# Patient Record
Sex: FEMALE | Race: WHITE | NOT HISPANIC OR LATINO | Employment: FULL TIME | URBAN - METROPOLITAN AREA
[De-identification: names, ages, dates, MRNs, and addresses within clinical notes are randomized per-mention and may not be internally consistent; named-entity substitution may affect disease eponyms.]

---

## 2017-02-03 DIAGNOSIS — Z12.31 OTHER SCREENING MAMMOGRAM: ICD-10-CM

## 2017-02-17 ENCOUNTER — OFFICE VISIT (OUTPATIENT)
Dept: OBSTETRICS AND GYNECOLOGY | Facility: CLINIC | Age: 47
End: 2017-02-17
Payer: COMMERCIAL

## 2017-02-17 VITALS
BODY MASS INDEX: 35.79 KG/M2 | HEIGHT: 67 IN | DIASTOLIC BLOOD PRESSURE: 64 MMHG | SYSTOLIC BLOOD PRESSURE: 118 MMHG | WEIGHT: 228 LBS

## 2017-02-17 DIAGNOSIS — Z01.419 WELL WOMAN EXAM WITH ROUTINE GYNECOLOGICAL EXAM: ICD-10-CM

## 2017-02-17 DIAGNOSIS — N89.8 VAGINAL DISCHARGE: Primary | ICD-10-CM

## 2017-02-17 PROCEDURE — 99999 PR PBB SHADOW E&M-EST. PATIENT-LVL III: CPT | Mod: PBBFAC,,, | Performed by: OBSTETRICS & GYNECOLOGY

## 2017-02-17 PROCEDURE — 99386 PREV VISIT NEW AGE 40-64: CPT | Mod: S$GLB,,, | Performed by: OBSTETRICS & GYNECOLOGY

## 2017-02-17 PROCEDURE — 87480 CANDIDA DNA DIR PROBE: CPT

## 2017-02-17 RX ORDER — ALPRAZOLAM 1 MG/1
1 TABLET ORAL
COMMUNITY

## 2017-02-17 RX ORDER — CITALOPRAM 20 MG/1
20 TABLET, FILM COATED ORAL DAILY
COMMUNITY

## 2017-02-17 NOTE — PROGRESS NOTES
CC: Annual check-up    SUBJECTIVE:   46 y.o. female   for annual routine Pap and checkup. No LMP recorded. Patient has had a hysterectomy..  She has no unusual complaints. Sisters MTHFR heterozygote and wants rto know if needs to be tested.        Past Medical History   Diagnosis Date    Allergy     Depression      Past Surgical History   Procedure Laterality Date     section      Knee surgery      Hysterectomy       Social History     Social History    Marital status:      Spouse name: N/A    Number of children: N/A    Years of education: N/A     Occupational History    Not on file.     Social History Main Topics    Smoking status: Never Smoker    Smokeless tobacco: Not on file    Alcohol use Yes    Drug use: No    Sexual activity: Yes     Partners: Male     Other Topics Concern    Not on file     Social History Narrative     Family History   Problem Relation Age of Onset    Diabetes Mother     Diabetes Father      OB History    Para Term  AB SAB TAB Ectopic Multiple Living   2 2 2       2      # Outcome Date GA Lbr Guy/2nd Weight Sex Delivery Anes PTL Lv   2 Term     F CS-LTranv  N Y   1 Term     F CS-LTranv   Y            Current Outpatient Prescriptions   Medication Sig Dispense Refill    alprazolam (XANAX) 1 MG tablet Take 1 mg by mouth.      citalopram (CELEXA) 20 MG tablet Take 20 mg by mouth.      fexofenadine (ALLEGRA) 180 MG tablet Take 180 mg by mouth once daily.       No current facility-administered medications for this visit.      Allergies: Review of patient's allergies indicates no known allergies.     ROS:  Constitutional: no weight loss, weight gain, fever, fatigue  Eyes:  No vision changes, glasses/contacts  ENT/Mouth: No ulcers, sinus problems, ears ringing, headache  Cardiovascular: No inability to lie flat, chest pain, exercise intolerance, swelling, heart palpitations  Respiratory: No wheezing, coughing blood, shortness of breath, or  "cough  Gastrointestinal: No diarrhea, bloody stool, nausea/vomiting, constipation, gas, hemorrhoids  Genitourinary: No blood in urine, painful urination, urgency of urination, frequency of urination, incomplete emptying, incontinence, abnormal bleeding, painful periods, heavy periods, vaginal discharge, vaginal odor, painful intercourse, sexual problems, bleeding after intercourse.  Musculoskeletal: No muscle weakness  Skin/Breast: No painful breasts, nipple discharge, masses, rash, ulcers  Neurological: No passing out, seizures, numbness, headache  Endocrine: No diabetes, hypothyroid, hyperthyroid, hot flashes, hair loss, abnormal hair growth, ance  Psychiatric: No depression, crying  Hematologic: No bruises, bleeding, swollen lymph nodes, anemia.      OBJECTIVE:   The patient appears well, alert, oriented x 3, in no distress.  Visit Vitals    /64    Ht 5' 7" (1.702 m)    Wt 103.4 kg (228 lb)    BMI 35.71 kg/m2     NECK: no thyromegaly, trachea midline  SKIN: no acne, striae, hirsutism  BREAST EXAM: breasts appear normal, no suspicious masses, no skin or nipple changes or axillary nodes, surgical scars noted from breast rdxn  ABDOMEN: abdominoplasty scars and no hernias, masses, or hepatosplenomegaly  GENITALIA: normal external genitalia, no erythema, no discharge  URETHRA: normal urethra, normal urethral meatus  VAGINA: vaginal discharge copious and white  CERVIX: absent  UTERUS: uterus absent  ADNEXA: no mass, fullness, tenderness and rt ovary palpable      ASSESSMENT:   well woman  1. Vaginal discharge    2. Well woman exam with routine gynecological exam        PLAN:   Mammogram at [facility]  return annually or prn  Orders Placed This Encounter    VAGINOSIS SCREEN BY DNA PROBE   will call with results  "

## 2017-02-17 NOTE — MR AVS SNAPSHOT
[address], [address]  [place] [address]  Phone: [phone]  Fax: [phone]                  Nahomy Pate   2017 9:00 AM   Office Visit    Description:  Female : 1970   Provider:  Topher Dominique MD   Department:  [place]           Reason for Visit     Gynecologic Exam           Diagnoses this Visit        Comments    Vaginal discharge    -  Primary     Well woman exam with routine gynecological exam                To Do List           Goals (5 Years of Data)     None      Follow-Up and Disposition     Return in about 1 year (around 2018).    Follow-up and Disposition History      [facility]     [facility] Nurse Care Line -  Assistance  Unless otherwise directed by your provider, please contact [facility], our nurse care line that is available for  assistance.     Registered nurses in the [facility] Center provide: appointment scheduling, clinical advisement, health education, and other advisory services.  Call: [phone] (toll free)               Medications           Message regarding Medications     Verify the changes and/or additions to your medication regime listed below are the same as discussed with your clinician today.  If any of these changes or additions are incorrect, please notify your healthcare provider.        STOP taking these medications     buPROPion (WELLBUTRIN SR) 150 MG TBSR 12 hr tablet Take 1 tablet (150 mg total) by mouth 2 (two) times daily.           Verify that the below list of medications is an accurate representation of the medications you are currently taking.  If none reported, the list may be blank. If incorrect, please contact your healthcare provider. Carry this list with you in case of emergency.           Current Medications     alprazolam (XANAX) 1 MG tablet Take 1 mg by mouth.    citalopram (CELEXA) 20 MG tablet Take 20 mg by mouth.    fexofenadine (ALLEGRA) 180 MG tablet Take 180 mg  "by mouth once daily.           Clinical Reference Information           Your Vitals Were     BP Height Weight BMI       118/64 5' 7" (1.702 m) 103.4 kg (228 lb) 35.71 kg/m2       Blood Pressure          Most Recent Value    BP  118/64      Allergies as of 2/17/2017     No Known Allergies      Immunizations Administered on Date of Encounter - 2/17/2017     None      Orders Placed During Today's Visit      Normal Orders This Visit    VAGINOSIS SCREEN BY DNA PROBE       Language Assistance Services     ATTENTION: Language assistance services are available, free of charge. Please call [phone].      ATENCIÓN: Si devin hammond, tiene a valdes disposición servicios gratuitos de asistencia lingüística. Llame al [phone].     ADARSH Ý: N?u b?n nói Ti?ng Vi?t, có các d?ch v? h? tr? ngôn ng? mi?n phí dành cho b?n. G?i s? [phone].         [facility] complies with applicable Federal civil rights laws and does not discriminate on the basis of race, color, national origin, age, disability, or sex.        "

## 2017-02-19 LAB
CANDIDA RRNA VAG QL PROBE: NEGATIVE
G VAGINALIS RRNA GENITAL QL PROBE: POSITIVE
T VAGINALIS RRNA GENITAL QL PROBE: NEGATIVE

## 2017-02-20 ENCOUNTER — TELEPHONE (OUTPATIENT)
Dept: OBSTETRICS AND GYNECOLOGY | Facility: CLINIC | Age: 47
End: 2017-02-20

## 2017-02-20 RX ORDER — METRONIDAZOLE 500 MG/1
500 TABLET ORAL 2 TIMES DAILY
Qty: 14 TABLET | Refills: 0 | Status: SHIPPED | OUTPATIENT
Start: 2017-02-20 | End: 2017-02-27

## 2017-04-05 ENCOUNTER — TELEPHONE (OUTPATIENT)
Dept: OBSTETRICS AND GYNECOLOGY | Facility: CLINIC | Age: 47
End: 2017-04-05

## 2017-04-05 ENCOUNTER — PATIENT MESSAGE (OUTPATIENT)
Dept: OBSTETRICS AND GYNECOLOGY | Facility: CLINIC | Age: 47
End: 2017-04-05

## 2017-04-05 NOTE — TELEPHONE ENCOUNTER
----- Message from Earline Ramirez sent at 4/5/2017  2:21 PM CDT -----  Contact: self, [phone]  Patient called in returning your call. Please advise.

## 2018-07-30 ENCOUNTER — OFFICE VISIT (OUTPATIENT)
Dept: OBSTETRICS AND GYNECOLOGY | Facility: CLINIC | Age: 48
End: 2018-07-30
Payer: COMMERCIAL

## 2018-07-30 VITALS
HEIGHT: 67 IN | DIASTOLIC BLOOD PRESSURE: 60 MMHG | WEIGHT: 218 LBS | BODY MASS INDEX: 34.21 KG/M2 | SYSTOLIC BLOOD PRESSURE: 116 MMHG

## 2018-07-30 DIAGNOSIS — N39.0 URINARY TRACT INFECTION WITHOUT HEMATURIA, SITE UNSPECIFIED: Primary | ICD-10-CM

## 2018-07-30 DIAGNOSIS — Z01.419 WELL WOMAN EXAM WITH ROUTINE GYNECOLOGICAL EXAM: ICD-10-CM

## 2018-07-30 DIAGNOSIS — N89.8 VAGINAL DISCHARGE: ICD-10-CM

## 2018-07-30 PROCEDURE — 87186 SC STD MICRODIL/AGAR DIL: CPT

## 2018-07-30 PROCEDURE — 87491 CHLMYD TRACH DNA AMP PROBE: CPT

## 2018-07-30 PROCEDURE — 87088 URINE BACTERIA CULTURE: CPT

## 2018-07-30 PROCEDURE — 99999 PR PBB SHADOW E&M-EST. PATIENT-LVL III: CPT | Mod: PBBFAC,,, | Performed by: OBSTETRICS & GYNECOLOGY

## 2018-07-30 PROCEDURE — 87086 URINE CULTURE/COLONY COUNT: CPT

## 2018-07-30 PROCEDURE — 87660 TRICHOMONAS VAGIN DIR PROBE: CPT

## 2018-07-30 PROCEDURE — 99396 PREV VISIT EST AGE 40-64: CPT | Mod: S$GLB,,, | Performed by: OBSTETRICS & GYNECOLOGY

## 2018-07-30 PROCEDURE — 87077 CULTURE AEROBIC IDENTIFY: CPT

## 2018-07-30 RX ORDER — CIPROFLOXACIN 250 MG/1
250 TABLET, FILM COATED ORAL 2 TIMES DAILY
Qty: 6 TABLET | Refills: 0 | Status: SHIPPED | OUTPATIENT
Start: 2018-07-30 | End: 2018-08-04

## 2018-07-30 RX ORDER — FLUCONAZOLE 150 MG/1
150 TABLET ORAL ONCE
Qty: 1 TABLET | Refills: 1 | Status: SHIPPED | OUTPATIENT
Start: 2018-07-30 | End: 2018-07-30

## 2018-07-30 NOTE — PROGRESS NOTES
CC: Annual check-up    SUBJECTIVE:   48 y.o. female   for annual routine Pap and checkup. No LMP recorded. Patient has had a hysterectomy..  She complaints of pressure in her lower abdomen, but denies dysuria, frequency and urgency.      Sister and pt Abigail Oneill  8 months ago from MI, still trying to cope  Past Medical History:   Diagnosis Date    Allergy     Depression      Past Surgical History:   Procedure Laterality Date     SECTION      HYSTERECTOMY      KNEE SURGERY      OOPHORECTOMY       Social History     Social History    Marital status:      Spouse name: N/A    Number of children: N/A    Years of education: N/A     Occupational History    Not on file.     Social History Main Topics    Smoking status: Never Smoker    Smokeless tobacco: Not on file    Alcohol use Yes    Drug use: No    Sexual activity: Yes     Partners: Male     Other Topics Concern    Not on file     Social History Narrative    No narrative on file     Family History   Problem Relation Age of Onset    Diabetes Mother     Diabetes Father      OB History    Para Term  AB Living   2 2 2     2   SAB TAB Ectopic Multiple Live Births           2      # Outcome Date GA Lbr Guy/2nd Weight Sex Delivery Anes PTL Lv   2 Term     F CS-LTranv  N NORBERTO   1 Term     F CS-LTranv   NORBERTO            Current Outpatient Prescriptions   Medication Sig Dispense Refill    alprazolam (XANAX) 1 MG tablet Take 1 mg by mouth.      citalopram (CELEXA) 20 MG tablet Take 20 mg by mouth.      fexofenadine (ALLEGRA) 180 MG tablet Take 180 mg by mouth once daily.      ciprofloxacin HCl (CIPRO) 250 MG tablet Take 1 tablet (250 mg total) by mouth 2 (two) times daily. for 5 days 6 tablet 0     No current facility-administered medications for this visit.      Allergies: Patient has no known allergies.     ROS:  Constitutional: no weight loss, weight gain, fever, fatigue  Eyes:  No vision changes,  "glasses/contacts  ENT/Mouth: No ulcers, sinus problems, ears ringing, headache  Cardiovascular: No inability to lie flat, chest pain, exercise intolerance, swelling, heart palpitations  Respiratory: No wheezing, coughing blood, shortness of breath, or cough  Gastrointestinal: No diarrhea, bloody stool, nausea/vomiting, constipation, gas, hemorrhoids  Genitourinary: No blood in urine, painful urination, urgency of urination, frequency of urination, incomplete emptying, incontinence, abnormal bleeding, painful periods, heavy periods, vaginal discharge, vaginal odor, painful intercourse, sexual problems, bleeding after intercourse.  Musculoskeletal: No muscle weakness  Skin/Breast: No painful breasts, nipple discharge, masses, rash, ulcers  Neurological: No passing out, seizures, numbness, headache  Endocrine: No diabetes, hypothyroid, hyperthyroid, hot flashes, hair loss, abnormal hair growth, ance  Psychiatric: No depression, crying  Hematologic: No bruises, bleeding, swollen lymph nodes, anemia.      OBJECTIVE:   The patient appears well, alert, oriented x 3, in no distress.  /60   Ht 5' 7" (1.702 m)   Wt 98.9 kg (218 lb)   BMI 34.14 kg/m²   NECK: no thyromegaly, trachea midline  SKIN: no acne, striae, hirsutism  BREAST EXAM: breasts appear normal, no suspicious masses, no skin or nipple changes or axillary nodes  ABDOMEN: no hernias, masses, or hepatosplenomegaly  GENITALIA: normal external genitalia, no erythema, no discharge  URETHRA: normal urethra, normal urethral meatus  VAGINA: vaginal discharge white  CERVIX: no lesions or cervical motion tenderness and absent  UTERUS: uterus absent  ADNEXA: not evaluated      ASSESSMENT:   well woman  1. Urinary tract infection without hematuria, site unspecified    2. Vaginal discharge    3. Well woman exam with routine gynecological exam        PLAN:   mammogram  return annually or prn  Orders Placed This Encounter    Urine culture    Vaginosis Screen by DNA " Probe    C. trachomatis/N. gonorrhoeae by AMP DNA    Mammo Digital Screening Bilat with CAD    ciprofloxacin HCl (CIPRO) 250 MG tablet

## 2018-07-31 LAB
CANDIDA RRNA VAG QL PROBE: NEGATIVE
G VAGINALIS RRNA GENITAL QL PROBE: NEGATIVE
T VAGINALIS RRNA GENITAL QL PROBE: NEGATIVE

## 2018-08-01 LAB
C TRACH DNA SPEC QL NAA+PROBE: NOT DETECTED
N GONORRHOEA DNA SPEC QL NAA+PROBE: NOT DETECTED

## 2018-08-03 LAB — BACTERIA UR CULT: NORMAL

## 2018-08-06 ENCOUNTER — PATIENT MESSAGE (OUTPATIENT)
Dept: OBSTETRICS AND GYNECOLOGY | Facility: CLINIC | Age: 48
End: 2018-08-06

## 2018-09-05 DIAGNOSIS — R10.11 RIGHT UPPER QUADRANT ABDOMINAL PAIN: Primary | ICD-10-CM

## 2018-11-23 ENCOUNTER — PATIENT MESSAGE (OUTPATIENT)
Dept: ORTHOPEDICS | Facility: CLINIC | Age: 48
End: 2018-11-23

## 2019-01-03 ENCOUNTER — OFFICE VISIT (OUTPATIENT)
Dept: CARDIOLOGY | Facility: CLINIC | Age: 49
End: 2019-01-03
Payer: COMMERCIAL

## 2019-01-03 VITALS
HEART RATE: 89 BPM | SYSTOLIC BLOOD PRESSURE: 127 MMHG | WEIGHT: 229 LBS | HEIGHT: 67 IN | BODY MASS INDEX: 35.94 KG/M2 | DIASTOLIC BLOOD PRESSURE: 85 MMHG

## 2019-01-03 DIAGNOSIS — R00.2 PALPITATIONS: ICD-10-CM

## 2019-01-03 DIAGNOSIS — G89.29 CHRONIC CHEST PAIN: ICD-10-CM

## 2019-01-03 DIAGNOSIS — R07.9 CHEST PAIN, UNSPECIFIED TYPE: Primary | ICD-10-CM

## 2019-01-03 DIAGNOSIS — F43.9 STRESS: ICD-10-CM

## 2019-01-03 DIAGNOSIS — Z91.89 CARDIOVASCULAR EVENT RISK: ICD-10-CM

## 2019-01-03 DIAGNOSIS — R07.9 CHRONIC CHEST PAIN: ICD-10-CM

## 2019-01-03 PROCEDURE — 99999 PR PBB SHADOW E&M-EST. PATIENT-LVL III: CPT | Mod: PBBFAC,,, | Performed by: INTERNAL MEDICINE

## 2019-01-03 PROCEDURE — 99205 OFFICE O/P NEW HI 60 MIN: CPT | Mod: S$GLB,,, | Performed by: INTERNAL MEDICINE

## 2019-01-03 PROCEDURE — 93005 EKG 12-LEAD: ICD-10-PCS | Mod: S$GLB,,, | Performed by: INTERNAL MEDICINE

## 2019-01-03 PROCEDURE — 3008F BODY MASS INDEX DOCD: CPT | Mod: CPTII,S$GLB,, | Performed by: INTERNAL MEDICINE

## 2019-01-03 PROCEDURE — 99999 PR PBB SHADOW E&M-EST. PATIENT-LVL III: ICD-10-PCS | Mod: PBBFAC,,, | Performed by: INTERNAL MEDICINE

## 2019-01-03 PROCEDURE — 93010 ELECTROCARDIOGRAM REPORT: CPT | Mod: S$GLB,,, | Performed by: INTERNAL MEDICINE

## 2019-01-03 PROCEDURE — 3008F PR BODY MASS INDEX (BMI) DOCUMENTED: ICD-10-PCS | Mod: CPTII,S$GLB,, | Performed by: INTERNAL MEDICINE

## 2019-01-03 PROCEDURE — 93005 ELECTROCARDIOGRAM TRACING: CPT | Mod: S$GLB,,, | Performed by: INTERNAL MEDICINE

## 2019-01-03 PROCEDURE — 93010 EKG 12-LEAD: ICD-10-PCS | Mod: S$GLB,,, | Performed by: INTERNAL MEDICINE

## 2019-01-03 PROCEDURE — 99205 PR OFFICE/OUTPT VISIT, NEW, LEVL V, 60-74 MIN: ICD-10-PCS | Mod: S$GLB,,, | Performed by: INTERNAL MEDICINE

## 2019-01-03 RX ORDER — TIZANIDINE 4 MG/1
4 TABLET ORAL
COMMUNITY
Start: 2018-11-30 | End: 2020-10-16

## 2019-01-03 NOTE — PROGRESS NOTES
Subjective:    Patient ID:  Nahomy Pate is a 48 y.o. female who presents for follow-up of Palpitations and Chest Pain      HPI  47 y/o female with no significant past med hx who presents for evaluation. She has a sister that  of an MI and a young age and wanted to get cardiac evaluation. She has been having substernal, non exertional, non radiating CP recently. Also has been having palps and some SOB. Denies orthopnea, PND, syncope, LE edema. No regular medicines and has not been working out. Non smoker (sister was a smoker) and no heavy EtOH use.     Review of Systems   Constitution: Negative for weakness and malaise/fatigue.   HENT: Negative for congestion.    Eyes: Negative for blurred vision.   Cardiovascular: Positive for chest pain, dyspnea on exertion and palpitations. Negative for claudication, cyanosis, irregular heartbeat, leg swelling, near-syncope, orthopnea, paroxysmal nocturnal dyspnea and syncope.   Respiratory: Positive for shortness of breath.    Endocrine: Negative for polyuria.   Hematologic/Lymphatic: Negative for bleeding problem.   Skin: Negative for itching and rash.   Musculoskeletal: Negative for joint swelling, muscle cramps and muscle weakness.   Gastrointestinal: Negative for abdominal pain, hematemesis, hematochezia, melena, nausea and vomiting.   Genitourinary: Negative for dysuria and hematuria.   Neurological: Negative for dizziness, focal weakness, headaches, light-headedness and loss of balance.   Psychiatric/Behavioral: Negative for depression. The patient is not nervous/anxious.         Objective:    Physical Exam   Constitutional: She is oriented to person, place, and time. She appears well-developed and well-nourished.   HENT:   Head: Normocephalic and atraumatic.   Neck: Neck supple. No JVD present.   Cardiovascular: Normal rate, regular rhythm and normal heart sounds.   Pulses:       Carotid pulses are 2+ on the right side, and 2+ on the left side.       Radial pulses are 2+  on the right side, and 2+ on the left side.        Femoral pulses are 2+ on the right side, and 2+ on the left side.       Dorsalis pedis pulses are 2+ on the right side, and 2+ on the left side.        Posterior tibial pulses are 2+ on the right side, and 2+ on the left side.   Pulmonary/Chest: Effort normal and breath sounds normal.   Abdominal: Soft. Bowel sounds are normal.   Musculoskeletal: She exhibits no edema.   Neurological: She is alert and oriented to person, place, and time.   Skin: Skin is warm and dry.   Psychiatric: She has a normal mood and affect. Her behavior is normal. Thought content normal.         Assessment:       1. Chest pain, unspecified type    2. Palpitations    3. Cardiovascular event risk    4. Stress      47 y/o pt with hx and presentation as above. Has been having symptoms concerning for cardiac etiology and will evaluate with non invasive cardiac stress test, Holter, and lipid panel/labs. She also would like to obtain a CAC score which I think is reasonable. Discussed the etiology, evaluation, and management of CAD. Discussed the importance of heart healthy diet, and regular exercise.         Plan:       -Treadmill stress  -Holter monitor  -Lipid panel  -CAC score  -f/u in 1 month

## 2019-01-14 ENCOUNTER — PATIENT MESSAGE (OUTPATIENT)
Dept: CARDIOLOGY | Facility: CLINIC | Age: 49
End: 2019-01-14

## 2019-01-15 ENCOUNTER — TELEPHONE (OUTPATIENT)
Dept: CARDIOLOGY | Facility: CLINIC | Age: 49
End: 2019-01-15

## 2019-01-15 ENCOUNTER — HOSPITAL ENCOUNTER (OUTPATIENT)
Dept: CARDIOLOGY | Facility: HOSPITAL | Age: 49
Discharge: HOME | End: 2019-01-15
Attending: INTERNAL MEDICINE
Payer: COMMERCIAL

## 2019-01-15 DIAGNOSIS — R07.9 CHEST PAIN, UNSPECIFIED TYPE: ICD-10-CM

## 2019-01-15 DIAGNOSIS — R00.2 PALPITATIONS: ICD-10-CM

## 2019-01-15 DIAGNOSIS — Z91.89 CARDIOVASCULAR EVENT RISK: ICD-10-CM

## 2019-01-15 LAB
CV STRESS BASE HR: 90
DIASTOLIC BLOOD PRESSURE: 90
OHS CV CPX 1 MINUTE RECOVERY HEART RATE: 160 BPM
OHS CV CPX 85 PERCENT MAX PREDICTED HEART RATE MALE: 141
OHS CV CPX MAX PREDICTED HEART RATE: 166
OHS CV CPX PATIENT IS FEMALE: 1
OHS CV CPX PATIENT IS MALE: 0
OHS CV CPX PEAK DIASTOLIC BLOOD PRESSURE: 95 MMHG
OHS CV CPX PEAK HEAR RATE: 173
OHS CV CPX PEAK RATE PRESSURE PRODUCT: NORMAL
OHS CV CPX PEAK SYSTOLIC BLOOD PRESSURE: 172
OHS CV CPX PERCENT MAX PREDICTED HEART RATE ACHIEVED: 105
OHS CV CPX PERCENT TARGET HEART RATE ACHIEVED: 117.69
OHS CV CPX RATE PRESSURE PRODUCT PRESENTING: NORMAL
OHS CV CPX TARGET HEART RATE: 147
STRESS ANGINA INDEX: 0
STRESS ECHO POST EXERCISE DUR MIN: 10 MIN
SYSTOLIC BLOOD PRESSURE: 136

## 2019-01-15 PROCEDURE — 93016 CV STRESS TEST SUPVJ ONLY: CPT | Mod: ,,, | Performed by: INTERNAL MEDICINE

## 2019-01-15 PROCEDURE — 93017 CV STRESS TEST TRACING ONLY: CPT | Mod: PO,ER

## 2019-01-15 PROCEDURE — 93016 STRESS TEST ONLY (CUPID ONLY): ICD-10-PCS | Mod: ,,, | Performed by: INTERNAL MEDICINE

## 2019-01-15 PROCEDURE — 93018 STRESS TEST ONLY (CUPID ONLY): ICD-10-PCS | Mod: ,,, | Performed by: INTERNAL MEDICINE

## 2019-01-15 PROCEDURE — 93018 CV STRESS TEST I&R ONLY: CPT | Mod: ,,, | Performed by: INTERNAL MEDICINE

## 2019-01-15 NOTE — TELEPHONE ENCOUNTER
----- Message from Leander Talavera MD sent at 1/15/2019  4:15 PM CST -----  Your stress test was normal

## 2019-01-18 ENCOUNTER — LAB VISIT (OUTPATIENT)
Dept: LAB | Facility: HOSPITAL | Age: 49
End: 2019-01-18
Attending: INTERNAL MEDICINE
Payer: COMMERCIAL

## 2019-01-18 DIAGNOSIS — R07.9 CHEST PAIN, UNSPECIFIED TYPE: ICD-10-CM

## 2019-01-18 DIAGNOSIS — Z91.89 CARDIOVASCULAR EVENT RISK: ICD-10-CM

## 2019-01-18 LAB
ALBUMIN SERPL BCP-MCNC: 4.1 G/DL
ALP SERPL-CCNC: 63 U/L
ALT SERPL W/O P-5'-P-CCNC: 22 U/L
ANION GAP SERPL CALC-SCNC: 6 MMOL/L
AST SERPL-CCNC: 22 U/L
BASOPHILS # BLD AUTO: 0.02 K/UL
BASOPHILS NFR BLD: 0.3 %
BILIRUB SERPL-MCNC: 0.7 MG/DL
BUN SERPL-MCNC: 17 MG/DL
CALCIUM SERPL-MCNC: 8.8 MG/DL
CHLORIDE SERPL-SCNC: 104 MMOL/L
CHOLEST SERPL-MCNC: 198 MG/DL
CHOLEST/HDLC SERPL: 5.2 {RATIO}
CO2 SERPL-SCNC: 28 MMOL/L
CREAT SERPL-MCNC: 0.78 MG/DL
DIFFERENTIAL METHOD: NORMAL
EOSINOPHIL # BLD AUTO: 0.1 K/UL
EOSINOPHIL NFR BLD: 2.1 %
ERYTHROCYTE [DISTWIDTH] IN BLOOD BY AUTOMATED COUNT: 13.3 %
EST. GFR  (AFRICAN AMERICAN): >60 ML/MIN/1.73 M^2
EST. GFR  (NON AFRICAN AMERICAN): >60 ML/MIN/1.73 M^2
GLUCOSE SERPL-MCNC: 128 MG/DL
HCT VFR BLD AUTO: 42.6 %
HDLC SERPL-MCNC: 38 MG/DL
HDLC SERPL: 19.2 %
HGB BLD-MCNC: 13.7 G/DL
LDLC SERPL CALC-MCNC: 130 MG/DL
LYMPHOCYTES # BLD AUTO: 1.6 K/UL
LYMPHOCYTES NFR BLD: 27.9 %
MCH RBC QN AUTO: 28.2 PG
MCHC RBC AUTO-ENTMCNC: 32.2 G/DL
MCV RBC AUTO: 88 FL
MONOCYTES # BLD AUTO: 0.6 K/UL
MONOCYTES NFR BLD: 9.7 %
NEUTROPHILS # BLD AUTO: 3.5 K/UL
NEUTROPHILS NFR BLD: 59.7 %
NONHDLC SERPL-MCNC: 160 MG/DL
PLATELET # BLD AUTO: 223 K/UL
PMV BLD AUTO: 10.2 FL
POTASSIUM SERPL-SCNC: 4.3 MMOL/L
PROT SERPL-MCNC: 6.9 G/DL
RBC # BLD AUTO: 4.85 M/UL
SODIUM SERPL-SCNC: 138 MMOL/L
TRIGL SERPL-MCNC: 150 MG/DL
WBC # BLD AUTO: 5.8 K/UL

## 2019-01-18 PROCEDURE — 36415 COLL VENOUS BLD VENIPUNCTURE: CPT | Mod: PO,ER

## 2019-01-18 PROCEDURE — 80053 COMPREHEN METABOLIC PANEL: CPT | Mod: PO,ER

## 2019-01-18 PROCEDURE — 80061 LIPID PANEL: CPT

## 2019-01-18 PROCEDURE — 85025 COMPLETE CBC W/AUTO DIFF WBC: CPT | Mod: PO,ER

## 2019-01-22 ENCOUNTER — PATIENT MESSAGE (OUTPATIENT)
Dept: CARDIOLOGY | Facility: CLINIC | Age: 49
End: 2019-01-22

## 2019-01-24 ENCOUNTER — PATIENT MESSAGE (OUTPATIENT)
Dept: CARDIOLOGY | Facility: CLINIC | Age: 49
End: 2019-01-24

## 2019-01-25 ENCOUNTER — PATIENT MESSAGE (OUTPATIENT)
Dept: CARDIOLOGY | Facility: CLINIC | Age: 49
End: 2019-01-25

## 2019-02-14 ENCOUNTER — OFFICE VISIT (OUTPATIENT)
Dept: CARDIOLOGY | Facility: CLINIC | Age: 49
End: 2019-02-14
Payer: COMMERCIAL

## 2019-02-14 VITALS
HEART RATE: 88 BPM | BODY MASS INDEX: 34.69 KG/M2 | DIASTOLIC BLOOD PRESSURE: 86 MMHG | WEIGHT: 221 LBS | OXYGEN SATURATION: 95 % | HEIGHT: 67 IN | SYSTOLIC BLOOD PRESSURE: 134 MMHG

## 2019-02-14 DIAGNOSIS — F32.A DEPRESSION, UNSPECIFIED DEPRESSION TYPE: ICD-10-CM

## 2019-02-14 DIAGNOSIS — R73.9 BLOOD GLUCOSE ELEVATED: ICD-10-CM

## 2019-02-14 DIAGNOSIS — R07.9 CHEST PAIN, UNSPECIFIED TYPE: Primary | ICD-10-CM

## 2019-02-14 DIAGNOSIS — Z91.89 CARDIOVASCULAR EVENT RISK: ICD-10-CM

## 2019-02-14 DIAGNOSIS — R00.2 PALPITATIONS: ICD-10-CM

## 2019-02-14 DIAGNOSIS — F43.9 STRESS: ICD-10-CM

## 2019-02-14 DIAGNOSIS — E78.89 LIPIDS ABNORMAL: ICD-10-CM

## 2019-02-14 PROCEDURE — 99999 PR PBB SHADOW E&M-EST. PATIENT-LVL III: CPT | Mod: PBBFAC,,, | Performed by: INTERNAL MEDICINE

## 2019-02-14 PROCEDURE — 99214 OFFICE O/P EST MOD 30 MIN: CPT | Mod: S$GLB,,, | Performed by: INTERNAL MEDICINE

## 2019-02-14 PROCEDURE — 3008F BODY MASS INDEX DOCD: CPT | Mod: CPTII,S$GLB,, | Performed by: INTERNAL MEDICINE

## 2019-02-14 PROCEDURE — 99999 PR PBB SHADOW E&M-EST. PATIENT-LVL III: ICD-10-PCS | Mod: PBBFAC,,, | Performed by: INTERNAL MEDICINE

## 2019-02-14 PROCEDURE — 3008F PR BODY MASS INDEX (BMI) DOCUMENTED: ICD-10-PCS | Mod: CPTII,S$GLB,, | Performed by: INTERNAL MEDICINE

## 2019-02-14 PROCEDURE — 99214 PR OFFICE/OUTPT VISIT, EST, LEVL IV, 30-39 MIN: ICD-10-PCS | Mod: S$GLB,,, | Performed by: INTERNAL MEDICINE

## 2019-02-17 NOTE — PROGRESS NOTES
Subjective:    Patient ID:  Nahomy Pate is a 48 y.o. female who presents for follow-up of Chest Pain      HPI     49 y/o female with no significant past med hx who presents for evaluation. She has a sister that  of an MI and a young age and wanted to get cardiac evaluation. She has been having substernal, non exertional, non radiating CP recently. Also has been having palps and some SOB. Denies orthopnea, PND, syncope, LE edema. No regular medicines and has not been working out. Non smoker (sister was a smoker) and no heavy EtOH use. Had negative stress test, CAC score of zero and labs with elevated glucose. Did not obtain Holter bc was too expensive.     Review of Systems   Constitution: Negative for weakness and malaise/fatigue.   HENT: Negative for congestion.    Eyes: Negative for blurred vision.   Cardiovascular: Positive for chest pain and dyspnea on exertion. Negative for claudication, cyanosis, irregular heartbeat, leg swelling, near-syncope, orthopnea, palpitations, paroxysmal nocturnal dyspnea and syncope.   Respiratory: Negative for shortness of breath.    Endocrine: Negative for polyuria.   Hematologic/Lymphatic: Negative for bleeding problem.   Skin: Negative for itching and rash.   Musculoskeletal: Negative for joint swelling, muscle cramps and muscle weakness.   Gastrointestinal: Negative for abdominal pain, hematemesis, hematochezia, melena, nausea and vomiting.   Genitourinary: Negative for dysuria and hematuria.   Neurological: Negative for dizziness, focal weakness, headaches, light-headedness and loss of balance.   Psychiatric/Behavioral: Negative for depression. The patient is not nervous/anxious.         Objective:    Physical Exam   Constitutional: She is oriented to person, place, and time. She appears well-developed and well-nourished.   HENT:   Head: Normocephalic and atraumatic.   Neck: Neck supple. No JVD present.   Cardiovascular: Normal rate, regular rhythm and normal heart sounds.    Pulses:       Carotid pulses are 2+ on the right side, and 2+ on the left side.       Radial pulses are 2+ on the right side, and 2+ on the left side.        Femoral pulses are 2+ on the right side, and 2+ on the left side.       Dorsalis pedis pulses are 2+ on the right side, and 2+ on the left side.        Posterior tibial pulses are 2+ on the right side, and 2+ on the left side.   Pulmonary/Chest: Effort normal and breath sounds normal.   Abdominal: Soft. Bowel sounds are normal.   Musculoskeletal: She exhibits no edema.   Neurological: She is alert and oriented to person, place, and time.   Skin: Skin is warm and dry.   Psychiatric: She has a normal mood and affect. Her behavior is normal. Thought content normal.         Assessment:       1. Chest pain, unspecified type    2. Blood glucose elevated    3. Lipids abnormal    4. Palpitations    5. Cardiovascular event risk    6. Depression, unspecified depression type    7. Stress      49 y/o pt with hx and presentation as above. Doing well from a cardiac perspective and compensated from a HF perspective. Will obtain lipid panel in a few months and A1C. Needs PCP.        Plan:       -Lipid panel  -HgbA1C  -f/u in 1 year

## 2019-08-02 ENCOUNTER — OFFICE VISIT (OUTPATIENT)
Dept: OBSTETRICS AND GYNECOLOGY | Facility: CLINIC | Age: 49
End: 2019-08-02
Payer: COMMERCIAL

## 2019-08-02 VITALS
BODY MASS INDEX: 35.81 KG/M2 | DIASTOLIC BLOOD PRESSURE: 70 MMHG | WEIGHT: 228.63 LBS | SYSTOLIC BLOOD PRESSURE: 118 MMHG

## 2019-08-02 DIAGNOSIS — Z01.419 WELL WOMAN EXAM WITH ROUTINE GYNECOLOGICAL EXAM: Primary | ICD-10-CM

## 2019-08-02 PROCEDURE — 99999 PR PBB SHADOW E&M-EST. PATIENT-LVL III: ICD-10-PCS | Mod: PBBFAC,,, | Performed by: OBSTETRICS & GYNECOLOGY

## 2019-08-02 PROCEDURE — 99999 PR PBB SHADOW E&M-EST. PATIENT-LVL III: CPT | Mod: PBBFAC,,, | Performed by: OBSTETRICS & GYNECOLOGY

## 2019-08-02 PROCEDURE — 99396 PR PREVENTIVE VISIT,EST,40-64: ICD-10-PCS | Mod: S$GLB,,, | Performed by: OBSTETRICS & GYNECOLOGY

## 2019-08-02 PROCEDURE — 88142 CYTOPATH C/V THIN LAYER: CPT

## 2019-08-02 PROCEDURE — 99396 PREV VISIT EST AGE 40-64: CPT | Mod: S$GLB,,, | Performed by: OBSTETRICS & GYNECOLOGY

## 2019-08-02 NOTE — PROGRESS NOTES
CC: Annual check-up    SUBJECTIVE:   49 y.o. female   for annual routine Pap and checkup. No LMP recorded. Patient has had a hysterectomy..  She complains of occ ovulatroy painf, no hot flashes but does have some vag dryness alleviated with lubrication.        Past Medical History:   Diagnosis Date    Allergy     Depression      Past Surgical History:   Procedure Laterality Date     SECTION      HYSTERECTOMY      KNEE SURGERY      OOPHORECTOMY     tummy tuck  Social History     Socioeconomic History    Marital status:      Spouse name: Not on file    Number of children: Not on file    Years of education: Not on file    Highest education level: Not on file   Occupational History    Not on file   Social Needs    Financial resource strain: Not on file    Food insecurity:     Worry: Not on file     Inability: Not on file    Transportation needs:     Medical: Not on file     Non-medical: Not on file   Tobacco Use    Smoking status: Never Smoker    Smokeless tobacco: Never Used   Substance and Sexual Activity    Alcohol use: Yes    Drug use: No    Sexual activity: Yes     Partners: Male     Birth control/protection: See Surgical Hx   Lifestyle    Physical activity:     Days per week: Not on file     Minutes per session: Not on file    Stress: Not on file   Relationships    Social connections:     Talks on phone: Not on file     Gets together: Not on file     Attends Islam service: Not on file     Active member of club or organization: Not on file     Attends meetings of clubs or organizations: Not on file     Relationship status: Not on file   Other Topics Concern    Not on file   Social History Narrative    Not on file     Family History   Problem Relation Age of Onset    Diabetes Mother     Diabetes Father      OB History    Para Term  AB Living   2 2 2     2   SAB TAB Ectopic Multiple Live Births           2      # Outcome Date GA Lbr Guy/2nd Weight Sex  Delivery Anes PTL Lv   2 Term 07/07/05 38w0d   F CS-LTranv  N NORBERTO   1 Term 03/09/01 41w0d   F CS-LTranv   NORBERTO         Current Outpatient Medications   Medication Sig Dispense Refill    alprazolam (XANAX) 1 MG tablet Take 1 mg by mouth.      citalopram (CELEXA) 20 MG tablet Take 20 mg by mouth once daily.       fexofenadine (ALLEGRA) 180 MG tablet Take 180 mg by mouth once daily.      tiZANidine (ZANAFLEX) 4 MG tablet Take 4 mg by mouth as needed.        No current facility-administered medications for this visit.      Allergies: Patient has no known allergies.     ROS:  Constitutional: no weight loss, weight gain, fever, fatigue  Eyes:  No vision changes, glasses/contacts  ENT/Mouth: No ulcers, sinus problems, ears ringing, headache  Cardiovascular: No inability to lie flat, chest pain, exercise intolerance, swelling, heart palpitations  Respiratory: No wheezing, coughing blood, shortness of breath, or cough  Gastrointestinal: No diarrhea, bloody stool, nausea/vomiting, constipation, gas, hemorrhoids  Genitourinary: No blood in urine, painful urination, urgency of urination, frequency of urination, incomplete emptying, incontinence, abnormal bleeding, painful periods, heavy periods, vaginal discharge, vaginal odor, painful intercourse, sexual problems, bleeding after intercourse.  Musculoskeletal: No muscle weakness  Skin/Breast: No painful breasts, nipple discharge, masses, rash, ulcers  Neurological: No passing out, seizures, numbness, headache  Endocrine: No diabetes, hypothyroid, hyperthyroid, hot flashes, hair loss, abnormal hair growth, ance  Psychiatric: No depression, crying  Hematologic: No bruises, bleeding, swollen lymph nodes, anemia.      OBJECTIVE:   The patient appears well, alert, oriented x 3, in no distress.  /70   Wt 103.7 kg (228 lb 9.9 oz)   BMI 35.81 kg/m²   NECK: no thyromegaly, trachea midline  SKIN: no acne, striae, hirsutism  BREAST EXAM: breasts appear normal, no suspicious  masses, no skin or nipple changes or axillary nodes, surgical scars noted from breast rdxn  ABDOMEN: no hernias, masses, or hepatosplenomegaly  GENITALIA: normal external genitalia, no erythema, no discharge  URETHRA: normal urethra, normal urethral meatus  VAGINA: mucosal atrophy  CERVIX: no lesions or cervical motion tenderness and small prtion of post cx still present  UTERUS: uterus absent  ADNEXA: normal adnexa and no mass, fullness, tenderness  Still has rt ovary    ASSESSMENT:   well woman  1. Well woman exam with routine gynecological exam        PLAN:   mammogram  pap smear  return annually or prn  Orders Placed This Encounter    Liquid-based pap smear, screening

## 2019-09-27 ENCOUNTER — CLINICAL SUPPORT (OUTPATIENT)
Dept: FAMILY MEDICINE | Facility: CLINIC | Age: 49
End: 2019-09-27

## 2019-09-27 DIAGNOSIS — Z00.00 ROUTINE GENERAL MEDICAL EXAMINATION AT A HEALTH CARE FACILITY: Primary | ICD-10-CM

## 2019-09-27 PROCEDURE — 99000 PR SPECIMEN HANDLING,DR OFF->LAB: ICD-10-PCS | Mod: S$GLB,,, | Performed by: FAMILY MEDICINE

## 2019-09-27 PROCEDURE — 82075 CHG ASSAY OF BREATH ETHANOL: ICD-10-PCS | Mod: S$GLB,,, | Performed by: FAMILY MEDICINE

## 2019-09-27 PROCEDURE — 99201 PR OFFICE/OUTPT VISIT,NEW,LEVL I: ICD-10-PCS | Mod: S$GLB,,, | Performed by: FAMILY MEDICINE

## 2019-09-27 PROCEDURE — 82075 ASSAY OF BREATH ETHANOL: CPT | Mod: S$GLB,,, | Performed by: FAMILY MEDICINE

## 2019-09-27 PROCEDURE — 99201 PR OFFICE/OUTPT VISIT,NEW,LEVL I: CPT | Mod: S$GLB,,, | Performed by: FAMILY MEDICINE

## 2019-09-27 PROCEDURE — 99000 SPECIMEN HANDLING OFFICE-LAB: CPT | Mod: S$GLB,,, | Performed by: FAMILY MEDICINE

## 2019-09-27 NOTE — PROGRESS NOTES
Nahomy has presented today for Pre-Hire screening on behalf  Of [facility] Nahomy Pate has completed Non-DOT Physical, Non-DOT Drug Screen  and Non-DOT Breath Alcohol Test.      Total $ 105    Marybel Mar

## 2020-04-07 ENCOUNTER — TELEPHONE (OUTPATIENT)
Dept: CARDIOLOGY | Facility: CLINIC | Age: 50
End: 2020-04-07

## 2020-04-07 NOTE — TELEPHONE ENCOUNTER
Reached out to pt in regards to rescheduling clinical visit due to COVID-19 and [facility]     Offered pt the option of scheduling a virtual visit through the [employer] [facility]a, pt kindly declined, would like to reschedule clinical visit once the [facility] office is open again     Recall placed in chart

## 2020-08-14 ENCOUNTER — OFFICE VISIT (OUTPATIENT)
Dept: CARDIOLOGY | Facility: CLINIC | Age: 50
End: 2020-08-14
Payer: COMMERCIAL

## 2020-08-14 DIAGNOSIS — F32.A DEPRESSION, UNSPECIFIED DEPRESSION TYPE: ICD-10-CM

## 2020-08-14 DIAGNOSIS — Z91.89 CARDIOVASCULAR EVENT RISK: ICD-10-CM

## 2020-08-14 DIAGNOSIS — F43.9 STRESS: ICD-10-CM

## 2020-08-14 DIAGNOSIS — R00.2 PALPITATIONS: Primary | ICD-10-CM

## 2020-08-14 DIAGNOSIS — R07.9 CHEST PAIN, UNSPECIFIED TYPE: ICD-10-CM

## 2020-08-14 PROCEDURE — 99214 OFFICE O/P EST MOD 30 MIN: CPT | Mod: 95,,, | Performed by: INTERNAL MEDICINE

## 2020-08-14 PROCEDURE — 99214 PR OFFICE/OUTPT VISIT, EST, LEVL IV, 30-39 MIN: ICD-10-PCS | Mod: 95,,, | Performed by: INTERNAL MEDICINE

## 2020-08-27 NOTE — PROGRESS NOTES
Subjective:    Patient ID:  Nahomy Pate is a 50 y.o. female who presents for follow-up of No chief complaint on file.      HPI    The patient location is: [place], home  The chief complaint leading to consultation is: Palpitations    Visit type: audiovisual    Face to Face time with patient: 30  45 minutes of total time spent on the encounter, which includes face to face time and non-face to face time preparing to see the patient (eg, review of tests), Obtaining and/or reviewing separately obtained history, Documenting clinical information in the electronic or other health record, Independently interpreting results (not separately reported) and communicating results to the patient/family/caregiver, or Care coordination (not separately reported).         Each patient to whom he or she provides medical services by telemedicine is:  (1) informed of the relationship between the physician and patient and the respective role of any other health care provider with respect to management of the patient; and (2) notified that he or she may decline to receive medical services by telemedicine and may withdraw from such care at any time.    Notes:     49 y/o female with hx of palps and CP that presents for f/u. Initially presented for cardiac risk evaluation bc she has a sister that  of an MI and a young age. She had been having substernal, non exertional, non radiating CP and palps and some SOB. Had negative stress test, CAC score of zero and labs with elevated glucose. Did not obtain Holter bc was too expensive.   Continues to have intermittent palps and chest discomfort.  Denies orthopnea, PND, syncope, LE edema. No regular medicines and has not been working out. Non smoker (sister was a smoker) and no heavy EtOH use.      Review of Systems   Constitution: Negative for malaise/fatigue.   HENT: Negative for congestion.    Eyes: Negative for blurred vision.   Cardiovascular: Positive for chest pain and palpitations. Negative for  claudication, cyanosis, dyspnea on exertion, irregular heartbeat, leg swelling, near-syncope, orthopnea, paroxysmal nocturnal dyspnea and syncope.   Respiratory: Negative for shortness of breath.    Endocrine: Negative for polyuria.   Hematologic/Lymphatic: Negative for bleeding problem.   Skin: Negative for itching and rash.   Musculoskeletal: Negative for joint swelling, muscle cramps and muscle weakness.   Gastrointestinal: Negative for abdominal pain, hematemesis, hematochezia, melena, nausea and vomiting.   Genitourinary: Negative for dysuria and hematuria.   Neurological: Negative for dizziness, focal weakness, headaches, light-headedness, loss of balance and weakness.   Psychiatric/Behavioral: Negative for depression. The patient is not nervous/anxious.         Objective:    Physical Exam   Constitutional: She is oriented to person, place, and time. She appears well-developed and well-nourished.   HENT:   Head: Normocephalic and atraumatic.   Eyes: EOM are normal.   Neck: No JVD present.   Neurological: She is alert and oriented to person, place, and time.   Psychiatric: She has a normal mood and affect. Her behavior is normal. Judgment and thought content normal.         Assessment:       1. Palpitations    2. Cardiovascular event risk    3. Chest pain, unspecified type    4. Depression, unspecified depression type    5. Stress      49 y/o pt with hx and presentation as above. Doing well from a cardiac perspective and compensated from a HF perspective. Will obtain Holter to complete workup. Needs to lose weight and exercise. Primary prevention of CAD. Discussed the etiology, evaluation, and management of palpitations, CP, obesity. Discussed the importance of med compliance, heart healthy diet, and regular exercise.        Plan:       -Holter monitor  -f/u phone review

## 2020-10-16 ENCOUNTER — OFFICE VISIT (OUTPATIENT)
Dept: OBSTETRICS AND GYNECOLOGY | Facility: CLINIC | Age: 50
End: 2020-10-16
Payer: COMMERCIAL

## 2020-10-16 VITALS
BODY MASS INDEX: 36.56 KG/M2 | SYSTOLIC BLOOD PRESSURE: 120 MMHG | WEIGHT: 233.38 LBS | DIASTOLIC BLOOD PRESSURE: 78 MMHG

## 2020-10-16 DIAGNOSIS — Z01.419 WELL WOMAN EXAM WITH ROUTINE GYNECOLOGICAL EXAM: Primary | ICD-10-CM

## 2020-10-16 DIAGNOSIS — E03.8 OTHER SPECIFIED HYPOTHYROIDISM: ICD-10-CM

## 2020-10-16 PROCEDURE — 3008F PR BODY MASS INDEX (BMI) DOCUMENTED: ICD-10-PCS | Mod: CPTII,S$GLB,, | Performed by: OBSTETRICS & GYNECOLOGY

## 2020-10-16 PROCEDURE — 99999 PR PBB SHADOW E&M-EST. PATIENT-LVL III: ICD-10-PCS | Mod: PBBFAC,,, | Performed by: OBSTETRICS & GYNECOLOGY

## 2020-10-16 PROCEDURE — 3008F BODY MASS INDEX DOCD: CPT | Mod: CPTII,S$GLB,, | Performed by: OBSTETRICS & GYNECOLOGY

## 2020-10-16 PROCEDURE — 88175 CYTOPATH C/V AUTO FLUID REDO: CPT

## 2020-10-16 PROCEDURE — 99999 PR PBB SHADOW E&M-EST. PATIENT-LVL III: CPT | Mod: PBBFAC,,, | Performed by: OBSTETRICS & GYNECOLOGY

## 2020-10-16 PROCEDURE — 99396 PREV VISIT EST AGE 40-64: CPT | Mod: S$GLB,,, | Performed by: OBSTETRICS & GYNECOLOGY

## 2020-10-16 PROCEDURE — 99396 PR PREVENTIVE VISIT,EST,40-64: ICD-10-PCS | Mod: S$GLB,,, | Performed by: OBSTETRICS & GYNECOLOGY

## 2020-10-16 NOTE — PROGRESS NOTES
CC: Annual check-up    SUBJECTIVE:   50 y.o. female   for annual routine Pap and checkup. No LMP recorded. Patient has had a hysterectomy..  She has no unusual complaints. Stopped thyroid meds prescribed by Sallie, mom had Breast CA, caught early, she's 74        Past Medical History:   Diagnosis Date    Allergy     Depression      Past Surgical History:   Procedure Laterality Date     SECTION      HYSTERECTOMY      KNEE SURGERY      OOPHORECTOMY     small amount of cervical tissue still present--[facility] in [place]  Social History     Socioeconomic History    Marital status:      Spouse name: Not on file    Number of children: Not on file    Years of education: Not on file    Highest education level: Not on file   Occupational History    Not on file   Social Needs    Financial resource strain: Not on file    Food insecurity     Worry: Not on file     Inability: Not on file    Transportation needs     Medical: Not on file     Non-medical: Not on file   Tobacco Use    Smoking status: Never Smoker    Smokeless tobacco: Never Used   Substance and Sexual Activity    Alcohol use: Yes    Drug use: No    Sexual activity: Yes     Partners: Male     Birth control/protection: See Surgical Hx   Lifestyle    Physical activity     Days per week: Not on file     Minutes per session: Not on file    Stress: Not on file   Relationships    Social connections     Talks on phone: Not on file     Gets together: Not on file     Attends Yazidism service: Not on file     Active member of club or organization: Not on file     Attends meetings of clubs or organizations: Not on file     Relationship status: Not on file   Other Topics Concern    Not on file   Social History Narrative    Not on file     Family History   Problem Relation Age of Onset    Diabetes Mother     Breast cancer Mother     Diabetes Father      OB History    Para Term  AB Living   2 2 2     2   SAB TAB  Ectopic Multiple Live Births           2      # Outcome Date GA Lbr Guy/2nd Weight Sex Delivery Anes PTL Lv   2 Term 07/07/05 38w0d   F CS-LTranv  N NORBERTO   1 Term 03/09/01 41w0d   F CS-LTranv   NORBERTO         Current Outpatient Medications   Medication Sig Dispense Refill    alprazolam (XANAX) 1 MG tablet Take 1 mg by mouth.      citalopram (CELEXA) 20 MG tablet Take 20 mg by mouth once daily.       fexofenadine (ALLEGRA) 180 MG tablet Take 180 mg by mouth once daily.       No current facility-administered medications for this visit.      Allergies: Patient has no known allergies.     ROS:  Constitutional: no weight loss, weight gain, fever, fatigue  Eyes:  No vision changes, glasses/contacts  ENT/Mouth: No ulcers, sinus problems, ears ringing, headache  Cardiovascular: No inability to lie flat, chest pain, exercise intolerance, swelling, heart palpitations  Respiratory: No wheezing, coughing blood, shortness of breath, or cough  Gastrointestinal: No diarrhea, bloody stool, nausea/vomiting, constipation, gas, hemorrhoids  Genitourinary: No blood in urine, painful urination, urgency of urination, frequency of urination, incomplete emptying, incontinence, abnormal bleeding, painful periods, heavy periods, vaginal discharge, vaginal odor, painful intercourse, sexual problems, bleeding after intercourse.  Musculoskeletal: No muscle weakness  Skin/Breast: No painful breasts, nipple discharge, masses, rash, ulcers  Neurological: No passing out, seizures, numbness, headache  Endocrine: No diabetes, hypothyroid, hyperthyroid, hot flashes, hair loss, abnormal hair growth, ance  Psychiatric: No depression, crying  Hematologic: No bruises, bleeding, swollen lymph nodes, anemia.      OBJECTIVE:   The patient appears well, alert, oriented x 3, in no distress.  /78   Wt 105.9 kg (233 lb 6.4 oz)   BMI 36.56 kg/m²   NECK: small goiter on rt side  SKIN: no acne, striae, hirsutism  BREAST EXAM: breasts appear normal, no  suspicious masses, no skin or nipple changes or axillary nodes, surgical scars noted from Breast rdxn  ABDOMEN: no hernias, masses, or hepatosplenomegaly  GENITALIA: normal external genitalia, no erythema, no discharge  URETHRA: normal urethra, normal urethral meatus  VAGINA: Normal  CERVIX: no lesions or cervical motion tenderness  UTERUS: uterus absent  ADNEXA: no mass, fullness, tenderness      ASSESSMENT:   well woman  1. Well woman exam with routine gynecological exam    2. Other specified hypothyroidism        PLAN:   Mammogram at [facility]  pap smear  return annually or prn  Orders Placed This Encounter    Mammo Digital Screening Bilat w/ [facility]    Liquid-Based Pap Smear, Screening   resume synthroid and f/u with [facility] for goiter

## 2020-11-17 LAB
FINAL PATHOLOGIC DIAGNOSIS: NORMAL
Lab: NORMAL

## 2021-02-09 ENCOUNTER — HOSPITAL ENCOUNTER (OUTPATIENT)
Dept: CARDIOLOGY | Facility: HOSPITAL | Age: 51
Discharge: HOME | End: 2021-02-09
Attending: INTERNAL MEDICINE
Payer: COMMERCIAL

## 2021-02-09 DIAGNOSIS — R00.2 PALPITATIONS: ICD-10-CM

## 2021-02-09 PROCEDURE — 93227 XTRNL ECG REC<48 HR R&I: CPT | Mod: ,,, | Performed by: INTERNAL MEDICINE

## 2021-02-09 PROCEDURE — 93227 HOLTER MONITOR - 48 HOUR (CUPID ONLY): ICD-10-PCS | Mod: ,,, | Performed by: INTERNAL MEDICINE

## 2021-02-09 PROCEDURE — 93225 XTRNL ECG REC<48 HRS REC: CPT | Mod: PO

## 2021-02-12 LAB
OHS CV EVENT MONITOR DAY: 0
OHS CV HOLTER LENGTH DECIMAL HOURS: 48
OHS CV HOLTER LENGTH HOURS: 48
OHS CV HOLTER LENGTH MINUTES: 0

## 2021-02-20 ENCOUNTER — PATIENT MESSAGE (OUTPATIENT)
Dept: CARDIOLOGY | Facility: CLINIC | Age: 51
End: 2021-02-20

## 2021-03-04 ENCOUNTER — PATIENT MESSAGE (OUTPATIENT)
Dept: CARDIOLOGY | Facility: CLINIC | Age: 51
End: 2021-03-04

## 2021-03-04 ENCOUNTER — TELEPHONE (OUTPATIENT)
Dept: CARDIOLOGY | Facility: CLINIC | Age: 51
End: 2021-03-04

## 2021-03-27 ENCOUNTER — IMMUNIZATION (OUTPATIENT)
Dept: PRIMARY CARE CLINIC | Facility: CLINIC | Age: 51
End: 2021-03-27
Payer: COMMERCIAL

## 2021-03-27 DIAGNOSIS — Z23 NEED FOR VACCINATION: Primary | ICD-10-CM

## 2021-03-27 PROCEDURE — 0001A PR IMMUNIZ ADMIN, SARS-COV-2 COVID-19 VACC, 30MCG/0.3ML, 1ST DOSE: CPT | Mod: CV19,,, | Performed by: INTERNAL MEDICINE

## 2021-03-27 PROCEDURE — 91300 PR SARS-COV- 2 COVID-19 VACCINE, NO PRSV, 30MCG/0.3ML, IM: ICD-10-PCS | Mod: ,,, | Performed by: INTERNAL MEDICINE

## 2021-03-27 PROCEDURE — 91300 PR SARS-COV- 2 COVID-19 VACCINE, NO PRSV, 30MCG/0.3ML, IM: CPT | Mod: ,,, | Performed by: INTERNAL MEDICINE

## 2021-03-27 PROCEDURE — 0001A PR IMMUNIZ ADMIN, SARS-COV-2 COVID-19 VACC, 30MCG/0.3ML, 1ST DOSE: ICD-10-PCS | Mod: CV19,,, | Performed by: INTERNAL MEDICINE

## 2021-03-27 RX ADMIN — Medication 0.3 ML: at 10:03

## 2021-04-04 ENCOUNTER — HOSPITAL ENCOUNTER (EMERGENCY)
Facility: HOSPITAL | Age: 51
Discharge: HOME | End: 2021-04-05
Attending: EMERGENCY MEDICINE
Payer: COMMERCIAL

## 2021-04-04 DIAGNOSIS — R42 VERTIGO: Primary | ICD-10-CM

## 2021-04-04 LAB
ALBUMIN SERPL BCP-MCNC: 4.4 G/DL (ref 3.5–5.2)
ALP SERPL-CCNC: 72 U/L (ref 38–126)
ALT SERPL W/O P-5'-P-CCNC: 18 U/L (ref 10–44)
ANION GAP SERPL CALC-SCNC: 9 MMOL/L (ref 8–16)
AST SERPL-CCNC: 24 U/L (ref 15–46)
BASOPHILS # BLD AUTO: 0.02 K/UL (ref 0–0.2)
BASOPHILS NFR BLD: 0.3 % (ref 0–1.9)
BILIRUB SERPL-MCNC: 0.3 MG/DL (ref 0.1–1)
CALCIUM SERPL-MCNC: 9.6 MG/DL (ref 8.7–10.5)
CHLORIDE SERPL-SCNC: 99 MMOL/L (ref 95–110)
CO2 SERPL-SCNC: 30 MMOL/L (ref 23–29)
CREAT SERPL-MCNC: 0.85 MG/DL (ref 0.5–1.4)
DIFFERENTIAL METHOD: NORMAL
EOSINOPHIL # BLD AUTO: 0.1 K/UL (ref 0–0.5)
EOSINOPHIL NFR BLD: 2.2 % (ref 0–8)
ERYTHROCYTE [DISTWIDTH] IN BLOOD BY AUTOMATED COUNT: 13.5 % (ref 11.5–14.5)
EST. GFR  (AFRICAN AMERICAN): >60 ML/MIN/1.73 M^2
EST. GFR  (NON AFRICAN AMERICAN): >60 ML/MIN/1.73 M^2
GLUCOSE SERPL-MCNC: 108 MG/DL (ref 70–110)
HCT VFR BLD AUTO: 41.8 % (ref 37–48.5)
HGB BLD-MCNC: 13.8 G/DL (ref 12–16)
IMM GRANULOCYTES # BLD AUTO: 0.02 K/UL (ref 0–0.04)
IMM GRANULOCYTES NFR BLD AUTO: 0.3 % (ref 0–0.5)
LYMPHOCYTES # BLD AUTO: 2.3 K/UL (ref 1–4.8)
LYMPHOCYTES NFR BLD: 36.5 % (ref 18–48)
MCH RBC QN AUTO: 29.2 PG (ref 27–31)
MCHC RBC AUTO-ENTMCNC: 33 G/DL (ref 32–36)
MCV RBC AUTO: 88 FL (ref 82–98)
MONOCYTES # BLD AUTO: 0.4 K/UL (ref 0.3–1)
MONOCYTES NFR BLD: 6.2 % (ref 4–15)
NEUTROPHILS # BLD AUTO: 3.4 K/UL (ref 1.8–7.7)
NEUTROPHILS NFR BLD: 54.5 % (ref 38–73)
NRBC BLD-RTO: 0 /100 WBC
PLATELET # BLD AUTO: 206 K/UL (ref 150–450)
PMV BLD AUTO: 10.3 FL (ref 9.2–12.9)
POCT GLUCOSE: 87 MG/DL (ref 70–110)
POTASSIUM SERPL-SCNC: 3.9 MMOL/L (ref 3.5–5.1)
PROT SERPL-MCNC: 7.3 G/DL (ref 6–8.4)
RBC # BLD AUTO: 4.73 M/UL (ref 4–5.4)
SODIUM SERPL-SCNC: 138 MMOL/L (ref 136–145)
TROPONIN I SERPL-MCNC: <0.012 NG/ML (ref 0.01–0.03)
UUN UR-MCNC: 27 MG/DL (ref 7–17)
WBC # BLD AUTO: 6.31 K/UL (ref 3.9–12.7)

## 2021-04-04 PROCEDURE — 93010 EKG 12-LEAD: ICD-10-PCS | Mod: ,,, | Performed by: INTERNAL MEDICINE

## 2021-04-04 PROCEDURE — 84484 ASSAY OF TROPONIN QUANT: CPT | Mod: ER | Performed by: EMERGENCY MEDICINE

## 2021-04-04 PROCEDURE — 93005 ELECTROCARDIOGRAM TRACING: CPT | Mod: ER

## 2021-04-04 PROCEDURE — 25000003 PHARM REV CODE 250: Mod: ER | Performed by: EMERGENCY MEDICINE

## 2021-04-04 PROCEDURE — 96360 HYDRATION IV INFUSION INIT: CPT | Mod: ER

## 2021-04-04 PROCEDURE — 85025 COMPLETE CBC W/AUTO DIFF WBC: CPT | Mod: ER | Performed by: EMERGENCY MEDICINE

## 2021-04-04 PROCEDURE — 80053 COMPREHEN METABOLIC PANEL: CPT | Mod: ER | Performed by: EMERGENCY MEDICINE

## 2021-04-04 PROCEDURE — 99284 EMERGENCY DEPT VISIT MOD MDM: CPT | Mod: 25,ER

## 2021-04-04 PROCEDURE — 93010 ELECTROCARDIOGRAM REPORT: CPT | Mod: ,,, | Performed by: INTERNAL MEDICINE

## 2021-04-04 RX ORDER — LEVOTHYROXINE SODIUM 50 UG/1
50 TABLET ORAL
COMMUNITY

## 2021-04-04 RX ORDER — MECLIZINE HCL 12.5 MG 12.5 MG/1
25 TABLET ORAL
Status: COMPLETED | OUTPATIENT
Start: 2021-04-04 | End: 2021-04-04

## 2021-04-04 RX ADMIN — MECLIZINE 25 MG: 12.5 TABLET ORAL at 09:04

## 2021-04-04 RX ADMIN — SODIUM CHLORIDE 1000 ML: 0.9 INJECTION, SOLUTION INTRAVENOUS at 11:04

## 2021-04-05 VITALS
HEIGHT: 67 IN | DIASTOLIC BLOOD PRESSURE: 78 MMHG | BODY MASS INDEX: 30.92 KG/M2 | SYSTOLIC BLOOD PRESSURE: 130 MMHG | TEMPERATURE: 98 F | WEIGHT: 197 LBS | HEART RATE: 75 BPM | RESPIRATION RATE: 16 BRPM | OXYGEN SATURATION: 100 %

## 2021-04-05 RX ORDER — MECLIZINE HYDROCHLORIDE 25 MG/1
25 TABLET ORAL 3 TIMES DAILY PRN
Qty: 20 TABLET | Refills: 0 | Status: SHIPPED | OUTPATIENT
Start: 2021-04-05 | End: 2023-06-21 | Stop reason: SDUPTHER

## 2021-04-17 ENCOUNTER — IMMUNIZATION (OUTPATIENT)
Dept: PRIMARY CARE CLINIC | Facility: CLINIC | Age: 51
End: 2021-04-17

## 2021-04-17 DIAGNOSIS — Z23 NEED FOR VACCINATION: Primary | ICD-10-CM

## 2021-04-17 PROCEDURE — 91300 PR SARS-COV- 2 COVID-19 VACCINE, NO PRSV, 30MCG/0.3ML, IM: CPT | Mod: ,,, | Performed by: INTERNAL MEDICINE

## 2021-04-17 PROCEDURE — 0002A PR IMMUNIZ ADMIN, SARS-COV-2 COVID-19 VACC, 30MCG/0.3ML, 2ND DOSE: ICD-10-PCS | Mod: CV19,,, | Performed by: INTERNAL MEDICINE

## 2021-04-17 PROCEDURE — 0002A PR IMMUNIZ ADMIN, SARS-COV-2 COVID-19 VACC, 30MCG/0.3ML, 2ND DOSE: CPT | Mod: CV19,,, | Performed by: INTERNAL MEDICINE

## 2021-04-17 PROCEDURE — 91300 PR SARS-COV- 2 COVID-19 VACCINE, NO PRSV, 30MCG/0.3ML, IM: ICD-10-PCS | Mod: ,,, | Performed by: INTERNAL MEDICINE

## 2021-04-17 RX ADMIN — Medication 0.3 ML: at 11:04

## 2021-10-04 ENCOUNTER — OFFICE VISIT (OUTPATIENT)
Dept: OBSTETRICS AND GYNECOLOGY | Facility: CLINIC | Age: 51
End: 2021-10-04
Payer: COMMERCIAL

## 2021-10-04 VITALS
WEIGHT: 213.63 LBS | BODY MASS INDEX: 33.45 KG/M2 | SYSTOLIC BLOOD PRESSURE: 120 MMHG | DIASTOLIC BLOOD PRESSURE: 80 MMHG

## 2021-10-04 DIAGNOSIS — N75.0 BARTHOLIN'S CYST: ICD-10-CM

## 2021-10-04 DIAGNOSIS — Z01.419 WELL WOMAN EXAM WITH ROUTINE GYNECOLOGICAL EXAM: Primary | ICD-10-CM

## 2021-10-04 PROCEDURE — 88175 CYTOPATH C/V AUTO FLUID REDO: CPT | Performed by: OBSTETRICS & GYNECOLOGY

## 2021-10-04 PROCEDURE — 99999 PR PBB SHADOW E&M-EST. PATIENT-LVL III: CPT | Mod: PBBFAC,,, | Performed by: OBSTETRICS & GYNECOLOGY

## 2021-10-04 PROCEDURE — 3074F PR MOST RECENT SYSTOLIC BLOOD PRESSURE < 130 MM HG: ICD-10-PCS | Mod: CPTII,S$GLB,, | Performed by: OBSTETRICS & GYNECOLOGY

## 2021-10-04 PROCEDURE — 99396 PREV VISIT EST AGE 40-64: CPT | Mod: S$GLB,,, | Performed by: OBSTETRICS & GYNECOLOGY

## 2021-10-04 PROCEDURE — 3008F PR BODY MASS INDEX (BMI) DOCUMENTED: ICD-10-PCS | Mod: CPTII,S$GLB,, | Performed by: OBSTETRICS & GYNECOLOGY

## 2021-10-04 PROCEDURE — 3074F SYST BP LT 130 MM HG: CPT | Mod: CPTII,S$GLB,, | Performed by: OBSTETRICS & GYNECOLOGY

## 2021-10-04 PROCEDURE — 3008F BODY MASS INDEX DOCD: CPT | Mod: CPTII,S$GLB,, | Performed by: OBSTETRICS & GYNECOLOGY

## 2021-10-04 PROCEDURE — 1159F MED LIST DOCD IN RCRD: CPT | Mod: CPTII,S$GLB,, | Performed by: OBSTETRICS & GYNECOLOGY

## 2021-10-04 PROCEDURE — 3079F DIAST BP 80-89 MM HG: CPT | Mod: CPTII,S$GLB,, | Performed by: OBSTETRICS & GYNECOLOGY

## 2021-10-04 PROCEDURE — 99999 PR PBB SHADOW E&M-EST. PATIENT-LVL III: ICD-10-PCS | Mod: PBBFAC,,, | Performed by: OBSTETRICS & GYNECOLOGY

## 2021-10-04 PROCEDURE — 3079F PR MOST RECENT DIASTOLIC BLOOD PRESSURE 80-89 MM HG: ICD-10-PCS | Mod: CPTII,S$GLB,, | Performed by: OBSTETRICS & GYNECOLOGY

## 2021-10-04 PROCEDURE — 1159F PR MEDICATION LIST DOCUMENTED IN MEDICAL RECORD: ICD-10-PCS | Mod: CPTII,S$GLB,, | Performed by: OBSTETRICS & GYNECOLOGY

## 2021-10-04 PROCEDURE — 99396 PR PREVENTIVE VISIT,EST,40-64: ICD-10-PCS | Mod: S$GLB,,, | Performed by: OBSTETRICS & GYNECOLOGY

## 2021-10-06 ENCOUNTER — TELEPHONE (OUTPATIENT)
Dept: OBSTETRICS AND GYNECOLOGY | Facility: CLINIC | Age: 51
End: 2021-10-06

## 2021-10-07 RX ORDER — FLUCONAZOLE 150 MG/1
150 TABLET ORAL DAILY
Qty: 1 TABLET | Refills: 1 | Status: SHIPPED | OUTPATIENT
Start: 2021-10-07 | End: 2021-10-08

## 2021-10-07 RX ORDER — TERCONAZOLE 8 MG/G
1 CREAM VAGINAL NIGHTLY
Qty: 20 G | Refills: 0 | Status: SHIPPED | OUTPATIENT
Start: 2021-10-07 | End: 2021-10-10

## 2021-10-19 LAB
CYTOLOGIST CVX/VAG CYTO: NORMAL
CYTOLOGY CVX/VAG DOC CYTO: NORMAL
CYTOLOGY CVX/VAG DOC THIN PREP: NORMAL
CYTOLOGY THINPREP PAP COMMENT: NORMAL
CYTOLOGY THINPREP PAP COMMENT: NORMAL
PAP NOTE: NORMAL
PATHOLOGIST CVX/VAG CYTO: NORMAL
STAT OF ADQ CVX/VAG CYTO-IMP: NORMAL

## 2022-01-12 ENCOUNTER — TELEPHONE (OUTPATIENT)
Dept: OBSTETRICS AND GYNECOLOGY | Facility: CLINIC | Age: 52
End: 2022-01-12
Payer: COMMERCIAL

## 2022-07-25 ENCOUNTER — TELEPHONE (OUTPATIENT)
Dept: OBSTETRICS AND GYNECOLOGY | Facility: CLINIC | Age: 52
End: 2022-07-25
Payer: COMMERCIAL

## 2022-08-19 ENCOUNTER — TELEPHONE (OUTPATIENT)
Dept: OBSTETRICS AND GYNECOLOGY | Facility: CLINIC | Age: 52
End: 2022-08-19
Payer: COMMERCIAL

## 2022-08-19 DIAGNOSIS — Z12.39 SCREENING BREAST EXAMINATION: Primary | ICD-10-CM

## 2022-08-19 NOTE — TELEPHONE ENCOUNTER
----- Message from Mariza Shaikh sent at 8/19/2022 10:18 AM CDT -----  Type:  Mammogram    Caller is requesting to schedule their annual mammogram appointment.  Order is not listed in EPIC.  Please enter order and contact patient to schedule.  Name of Caller: pt  Where would they like the mammogram performed? [facility]   Would the patient rather a call back or a response via [facility]? Please call  Best Call Back Number: [phone]  Additional Information:  pt says that she was told she needed to have repeat Mammo done in 6 months and its already past time and [facility] needs a referral From  To complete screening

## 2022-08-23 ENCOUNTER — TELEPHONE (OUTPATIENT)
Dept: ADMINISTRATIVE | Facility: OTHER | Age: 52
End: 2022-08-23
Payer: COMMERCIAL

## 2022-08-23 ENCOUNTER — PATIENT MESSAGE (OUTPATIENT)
Dept: OBSTETRICS AND GYNECOLOGY | Facility: CLINIC | Age: 52
End: 2022-08-23
Payer: COMMERCIAL

## 2022-08-23 DIAGNOSIS — R92.8 ABNORMAL MAMMOGRAM OF LEFT BREAST: Primary | ICD-10-CM

## 2022-09-01 NOTE — TELEPHONE ENCOUNTER
Pt already had routine screening mammo 1/2022 and Left breast U/S. She needs a 6mo f/u diagnostic mammo order from 1/2022. Diagnostic order pended.

## 2022-09-08 ENCOUNTER — PATIENT MESSAGE (OUTPATIENT)
Dept: OBSTETRICS AND GYNECOLOGY | Facility: CLINIC | Age: 52
End: 2022-09-08
Payer: COMMERCIAL

## 2022-12-26 ENCOUNTER — HOSPITAL ENCOUNTER (EMERGENCY)
Facility: HOSPITAL | Age: 52
Discharge: HOME | End: 2022-12-26
Attending: FAMILY MEDICINE
Payer: COMMERCIAL

## 2022-12-26 VITALS
OXYGEN SATURATION: 97 % | SYSTOLIC BLOOD PRESSURE: 133 MMHG | DIASTOLIC BLOOD PRESSURE: 71 MMHG | HEART RATE: 74 BPM | WEIGHT: 230 LBS | TEMPERATURE: 98 F | BODY MASS INDEX: 34.86 KG/M2 | RESPIRATION RATE: 16 BRPM | HEIGHT: 68 IN

## 2022-12-26 DIAGNOSIS — R73.9 HYPERGLYCEMIA: ICD-10-CM

## 2022-12-26 DIAGNOSIS — I10 HYPERTENSION: ICD-10-CM

## 2022-12-26 DIAGNOSIS — I16.1 HYPERTENSIVE EMERGENCY: Primary | ICD-10-CM

## 2022-12-26 DIAGNOSIS — G44.59 OTHER COMPLICATED HEADACHE SYNDROME: ICD-10-CM

## 2022-12-26 LAB
ALBUMIN SERPL BCP-MCNC: 4.9 G/DL (ref 3.5–5.2)
ALP SERPL-CCNC: 81 U/L (ref 38–126)
ALT SERPL W/O P-5'-P-CCNC: 39 U/L (ref 10–44)
ANION GAP SERPL CALC-SCNC: 9 MMOL/L (ref 8–16)
AST SERPL-CCNC: 32 U/L (ref 15–46)
BASOPHILS # BLD AUTO: 0.03 K/UL (ref 0–0.2)
BASOPHILS NFR BLD: 0.3 % (ref 0–1.9)
BILIRUB SERPL-MCNC: 0.6 MG/DL (ref 0.1–1)
CALCIUM SERPL-MCNC: 9.3 MG/DL (ref 8.7–10.5)
CHLORIDE SERPL-SCNC: 104 MMOL/L (ref 95–110)
CO2 SERPL-SCNC: 25 MMOL/L (ref 23–29)
CREAT SERPL-MCNC: 0.61 MG/DL (ref 0.5–1.4)
DIFFERENTIAL METHOD: ABNORMAL
EOSINOPHIL # BLD AUTO: 0.1 K/UL (ref 0–0.5)
EOSINOPHIL NFR BLD: 1 % (ref 0–8)
ERYTHROCYTE [DISTWIDTH] IN BLOOD BY AUTOMATED COUNT: 12.9 % (ref 11.5–14.5)
EST. GFR  (NO RACE VARIABLE): >60 ML/MIN/1.73 M^2
GLUCOSE SERPL-MCNC: 198 MG/DL (ref 70–110)
HCT VFR BLD AUTO: 41.4 % (ref 37–48.5)
HGB BLD-MCNC: 13.9 G/DL (ref 12–16)
IMM GRANULOCYTES # BLD AUTO: 0.09 K/UL (ref 0–0.04)
IMM GRANULOCYTES NFR BLD AUTO: 0.9 % (ref 0–0.5)
LYMPHOCYTES # BLD AUTO: 3 K/UL (ref 1–4.8)
LYMPHOCYTES NFR BLD: 29.4 % (ref 18–48)
MCH RBC QN AUTO: 28.8 PG (ref 27–31)
MCHC RBC AUTO-ENTMCNC: 33.6 G/DL (ref 32–36)
MCV RBC AUTO: 86 FL (ref 82–98)
MONOCYTES # BLD AUTO: 0.9 K/UL (ref 0.3–1)
MONOCYTES NFR BLD: 8.4 % (ref 4–15)
NEUTROPHILS # BLD AUTO: 6.1 K/UL (ref 1.8–7.7)
NEUTROPHILS NFR BLD: 60 % (ref 38–73)
NRBC BLD-RTO: 0 /100 WBC
PLATELET # BLD AUTO: 263 K/UL (ref 150–450)
PMV BLD AUTO: 10.5 FL (ref 9.2–12.9)
POTASSIUM SERPL-SCNC: 4.3 MMOL/L (ref 3.5–5.1)
PROT SERPL-MCNC: 7.4 G/DL (ref 6–8.4)
RBC # BLD AUTO: 4.83 M/UL (ref 4–5.4)
SODIUM SERPL-SCNC: 138 MMOL/L (ref 136–145)
UUN UR-MCNC: 23 MG/DL (ref 7–17)
WBC # BLD AUTO: 10.13 K/UL (ref 3.9–12.7)

## 2022-12-26 PROCEDURE — 25500020 PHARM REV CODE 255: Mod: ER | Performed by: EMERGENCY MEDICINE

## 2022-12-26 PROCEDURE — 63600175 PHARM REV CODE 636 W HCPCS: Mod: ER | Performed by: FAMILY MEDICINE

## 2022-12-26 PROCEDURE — 93005 ELECTROCARDIOGRAM TRACING: CPT | Mod: ER

## 2022-12-26 PROCEDURE — 93010 ELECTROCARDIOGRAM REPORT: CPT | Mod: ,,, | Performed by: INTERNAL MEDICINE

## 2022-12-26 PROCEDURE — 80053 COMPREHEN METABOLIC PANEL: CPT | Mod: ER | Performed by: FAMILY MEDICINE

## 2022-12-26 PROCEDURE — 99285 EMERGENCY DEPT VISIT HI MDM: CPT | Mod: 25,ER

## 2022-12-26 PROCEDURE — 85025 COMPLETE CBC W/AUTO DIFF WBC: CPT | Mod: ER | Performed by: FAMILY MEDICINE

## 2022-12-26 PROCEDURE — 96374 THER/PROPH/DIAG INJ IV PUSH: CPT | Mod: 59,ER

## 2022-12-26 PROCEDURE — 93010 EKG 12-LEAD: ICD-10-PCS | Mod: ,,, | Performed by: INTERNAL MEDICINE

## 2022-12-26 PROCEDURE — 99900035 HC TECH TIME PER 15 MIN (STAT): Mod: ER

## 2022-12-26 PROCEDURE — 63600175 PHARM REV CODE 636 W HCPCS: Mod: ER | Performed by: EMERGENCY MEDICINE

## 2022-12-26 PROCEDURE — 25000003 PHARM REV CODE 250: Mod: ER | Performed by: EMERGENCY MEDICINE

## 2022-12-26 PROCEDURE — 96375 TX/PRO/DX INJ NEW DRUG ADDON: CPT | Mod: ER

## 2022-12-26 RX ORDER — KETOROLAC TROMETHAMINE 30 MG/ML
15 INJECTION, SOLUTION INTRAMUSCULAR; INTRAVENOUS
Status: COMPLETED | OUTPATIENT
Start: 2022-12-26 | End: 2022-12-26

## 2022-12-26 RX ORDER — LISINOPRIL 10 MG/1
10 TABLET ORAL DAILY
Qty: 90 TABLET | Refills: 3 | Status: SHIPPED | OUTPATIENT
Start: 2022-12-26 | End: 2023-06-21 | Stop reason: SDUPTHER

## 2022-12-26 RX ORDER — DIPHENHYDRAMINE HYDROCHLORIDE 50 MG/ML
25 INJECTION INTRAMUSCULAR; INTRAVENOUS
Status: COMPLETED | OUTPATIENT
Start: 2022-12-26 | End: 2022-12-26

## 2022-12-26 RX ORDER — LABETALOL HYDROCHLORIDE 5 MG/ML
20 INJECTION, SOLUTION INTRAVENOUS
Status: COMPLETED | OUTPATIENT
Start: 2022-12-26 | End: 2022-12-26

## 2022-12-26 RX ORDER — MORPHINE SULFATE 4 MG/ML
4 INJECTION, SOLUTION INTRAMUSCULAR; INTRAVENOUS
Status: COMPLETED | OUTPATIENT
Start: 2022-12-26 | End: 2022-12-26

## 2022-12-26 RX ORDER — ONDANSETRON 2 MG/ML
4 INJECTION INTRAMUSCULAR; INTRAVENOUS
Status: COMPLETED | OUTPATIENT
Start: 2022-12-26 | End: 2022-12-26

## 2022-12-26 RX ORDER — LISINOPRIL 5 MG/1
10 TABLET ORAL
Status: COMPLETED | OUTPATIENT
Start: 2022-12-26 | End: 2022-12-26

## 2022-12-26 RX ADMIN — MORPHINE SULFATE 4 MG: 4 INJECTION INTRAVENOUS at 07:12

## 2022-12-26 RX ADMIN — LABETALOL HYDROCHLORIDE 20 MG: 5 INJECTION, SOLUTION INTRAVENOUS at 06:12

## 2022-12-26 RX ADMIN — ONDANSETRON HYDROCHLORIDE 4 MG: 2 SOLUTION INTRAMUSCULAR; INTRAVENOUS at 06:12

## 2022-12-26 RX ADMIN — LISINOPRIL 10 MG: 5 TABLET ORAL at 08:12

## 2022-12-26 RX ADMIN — IOHEXOL 100 ML: 350 INJECTION, SOLUTION INTRAVENOUS at 07:12

## 2022-12-26 RX ADMIN — KETOROLAC TROMETHAMINE 15 MG: 30 INJECTION, SOLUTION INTRAMUSCULAR; INTRAVENOUS at 06:12

## 2022-12-26 RX ADMIN — DIPHENHYDRAMINE HYDROCHLORIDE 25 MG: 50 INJECTION INTRAMUSCULAR; INTRAVENOUS at 06:12

## 2022-12-26 NOTE — DISCHARGE INSTRUCTIONS
Thank you for coming in to see us at [facility]! It was nice to meet you, and I hope you feel better soon. Please feel free to return to the ER at any time should your symptoms get worse, or if you have different emergent concerns.    Our goal in the emergency department is to always give you outstanding care and exceptional service. You may receive a survey by mail or e-mail in the next week regarding your experience in our ED. We would greatly appreciate your completing and returning the survey. Your feedback provides us with a way to recognize our staff who give very good care and it helps us learn how to improve when your experience was below our aspiration of excellence.       Sincerely,    Silas Gallo MD  Medical Director  Emergency Department  [facility]  [facility]

## 2022-12-26 NOTE — Clinical Note
"Nahomy De La Torre" Rio was seen and treated in our emergency department on 12/26/2022.  She may return to work on 12/27/2022.       If you have any questions or concerns, please don't hesitate to call.      Silas Gallo MD"

## 2022-12-26 NOTE — ED PROVIDER NOTES
Encounter Date: 2022       History     Chief Complaint   Patient presents with    Headache     Pt to the Er reports she woke at 0530 this morning with a headache and nausea. Spouse took her BP at home 170/128.      52-year-old female complains of headache since this morning 4:00 a.m..  Nausea but no vomiting.  No visual changes.  No neck stiffness.  No fever.  No chest pain or shortness of breath.  Patient claims her blood pressure was elevated when checked at home.  She does not take blood pressure medications.  History of anxiety and takes Celexa, Xanax and medical marijuana.  Patient claims she took her Celexa at 12:30 a.m..    The history is provided by the patient.   Review of patient's allergies indicates:  No Known Allergies  Past Medical History:   Diagnosis Date    Allergy     Depression      Past Surgical History:   Procedure Laterality Date     SECTION      HYSTERECTOMY      KNEE SURGERY      OOPHORECTOMY       Family History   Problem Relation Age of Onset    Diabetes Mother     Breast cancer Mother     Diabetes Father      Social History     Tobacco Use    Smoking status: Never    Smokeless tobacco: Never   Substance Use Topics    Alcohol use: Yes     Comment: socially    Drug use: No     Review of Systems   Constitutional:  Negative for activity change, appetite change, chills and fever.   HENT:  Negative for congestion, ear discharge, rhinorrhea, sinus pressure, sinus pain, sore throat and trouble swallowing.    Eyes:  Negative for photophobia, pain, discharge, redness, itching and visual disturbance.   Respiratory:  Negative for cough, chest tightness, shortness of breath and wheezing.    Cardiovascular:  Negative for chest pain, palpitations and leg swelling.   Gastrointestinal:  Negative for abdominal distention, abdominal pain, constipation, diarrhea, nausea and vomiting.   Genitourinary:  Negative for dysuria, flank pain, frequency and hematuria.   Musculoskeletal:  Negative for  back pain, gait problem, neck pain and neck stiffness.   Skin:  Negative for rash and wound.   Neurological:  Positive for headaches. Negative for dizziness, tremors, seizures, syncope, speech difficulty, weakness, light-headedness and numbness.   Psychiatric/Behavioral:  Negative for behavioral problems, confusion, hallucinations and sleep disturbance. The patient is not nervous/anxious.    All other systems reviewed and are negative.    Physical Exam     Initial Vitals [12/26/22 0527]   BP Pulse Resp Temp SpO2   (!) 203/107 94 18 98 °F (36.7 °C) 98 %      MAP       --         Physical Exam    Nursing note and vitals reviewed.  Constitutional: Vital signs are normal. She appears well-developed and well-nourished. She is active. No distress.   HENT:   Head: Normocephalic.   Nose: Nose normal.   Mouth/Throat: Oropharynx is clear and moist and mucous membranes are normal.   Eyes: Conjunctivae, EOM and lids are normal.   Neck: Neck supple. No Brudzinski's sign and no Kernig's sign noted.   Normal range of motion.  Cardiovascular:  Normal rate, regular rhythm, S1 normal, S2 normal and normal heart sounds.           Pulmonary/Chest: Breath sounds normal. No respiratory distress. She has no wheezes. She has no rhonchi. She has no rales. She exhibits no tenderness.   Abdominal: Abdomen is soft. Bowel sounds are normal. She exhibits no distension. There is no abdominal tenderness. There is no guarding.   Musculoskeletal:         General: Normal range of motion.      Right upper arm: Normal.      Left upper arm: Normal.      Cervical back: Normal range of motion and neck supple. No rigidity. No spinous process tenderness or muscular tenderness. Normal range of motion.      Right lower leg: Normal.      Left lower leg: Normal.     Neurological: She is alert and oriented to person, place, and time. She has normal strength. No cranial nerve deficit or sensory deficit. GCS score is 15. GCS eye subscore is 4. GCS verbal subscore  is 5. GCS motor subscore is 6.   Skin: Skin is warm. Capillary refill takes less than 2 seconds.   Psychiatric: She has a normal mood and affect. Her speech is normal and behavior is normal. Thought content normal. Cognition and memory are normal.       ED Course   Procedures  Labs Reviewed   CBC W/ AUTO DIFFERENTIAL   COMPREHENSIVE METABOLIC PANEL     EKG Readings: (Independently Interpreted)   Initial Reading: No STEMI. Rhythm: Normal Sinus Rhythm. Heart Rate: 77. Ectopy: No Ectopy. Conduction: Normal. ST Segments: Normal ST Segments. T Waves: Normal. Clinical Impression: Normal Sinus Rhythm     Imaging Results    None          Medications   ondansetron injection 4 mg (has no administration in time range)   ketorolac injection 15 mg (has no administration in time range)   diphenhydrAMINE injection 25 mg (has no administration in time range)     Medical Decision Making:   Initial Assessment:   Headache and elevated blood pressure.  No history of blood pressure.  Negative for meningeal signs.  No fever.  No neurological deficits.  Bilateral pupils equal reactive.    EKG cardiac monitor,  Differential Diagnosis:   , elevated blood pressure, anxiety, cluster headache, tension headache,  Clinical Tests:   Medical Tests: Ordered and Reviewed  ED Management:  Headache with elevated blood pressure.  Normal EKG.  Patient turned over at shift change.                            Clinical Impression:   Final diagnoses:  [I10] Hypertension  [G44.209] Acute non intractable tension-type headache (Primary)               Hai Gonzales MD  12/26/22 6092

## 2022-12-26 NOTE — PROVIDER PROGRESS NOTES - EMERGENCY DEPT.
Encounter Date: 12/26/2022    ED Physician Progress Notes          6:46 AM - care assumed from Dr. Gonzales at 6am pending labs and re-eval for headache and elevated BP. Initial triage SBP >200, however on repeat in 160's at time of report. Pt re-eval and states headache still severe. 's/100's. Neuro exam unremarkable. Pt reports NO hx of migraines, but fluctuating BP over the past month (no prior hx).    DDx: HTN emergency, aneurysm, SAH, ICH, stroke, dissection  Plan: stat CTA head/neck, labetalol, morphine for pain.        GENERALIZED APPEARANCE:   alert, oriented to person, place, and time, drowsy  VITAL SIGNS:  Per nurse's note, reviewed by me .  SKIN:  Warm, dry; no cyanosis; no rash.  HEAD:  atraumatic, normocephalic. no scalp swelling, no tenderness.  EYES:  no conjunctival pallor, no scleral icterus, PERRL (no anisocoria), EOMI, NO nystagmus.  ENMT:  Pharynx:  airway patent: no stridor; mucous membranes moist.  NECK:  no tenderness, no stiffness, no lymphadenopathy, no thyromegaly.  CHEST AND RESPIRATORY:  no rales, no rhonchi, no wheezes; breath sounds equal bilaterally.  HEART AND CARDIOVASCULAR:  no irregularity; no murmur, no gallop, distal pulses palpable.  ABDOMEN AND GI:  Soft; no tenderness, no guarding, no rebound, no palpable masses, no CVA tenderness.  EXTREMITIES:  no deformity, no edema, no tenderness  NEURO AND PSYCH:  Mental status as above.  Cranial nerves grossly intact, tongue uvula midline; strength symmetric, sensation grossly intact. Finger to nose WNL, heel to shin normal, dysdiadochokinesia neg.       7:40 AM - Head CT: No aneurysm noted, no swelling, mass or bleed as interpreted by me.    7:54 AM -- radillogy confirms read. Will dc home as BP now 145/67. Lisinopril PO here and Rx. CMP shows hyperglycemia. Pt counseled on HTN diagnosis and borderline/prediabetes (possible DM2) diagnoses. Will need strict diet and exercise. Pt counseled extensively on diet and consequences of DM  and HTN long-term.    Close f/u with PCP in 1 week, if possible.      CTA Head and Neck (xpd)   Final Result      Negative CTA head and neck.  No large vessel occlusion, stenosis, or aneurysm.      All CT scans at this facility are performed  using dose modulation techniques as appropriate to performed exam including the following:  automated exposure control; adjustment of mA and/or kV according to the patients size (this includes techniques or standardized protocols for targeted exams where dose is matched to indication/reason for exam: i.e. extremities or head);  iterative reconstruction technique.         Electronically signed by: Geovanni Flores MD   Date:    12/26/2022   Time:    07:45          Labs Reviewed   CBC W/ AUTO DIFFERENTIAL - Abnormal; Notable for the following components:       Result Value    Immature Granulocytes 0.9 (*)     Immature Grans (Abs) 0.09 (*)     All other components within normal limits   COMPREHENSIVE METABOLIC PANEL - Abnormal; Notable for the following components:    Glucose 198 (*)     BUN 23 (*)     All other components within normal limits       Medications   lisinopriL tablet 10 mg (has no administration in time range)   ondansetron injection 4 mg (4 mg Intravenous Given 12/26/22 0603)   ketorolac injection 15 mg (15 mg Intravenous Given 12/26/22 0606)   diphenhydrAMINE injection 25 mg (25 mg Intravenous Given 12/26/22 0604)   labetaloL injection 20 mg (20 mg Intravenous Given 12/26/22 0653)   morphine injection 4 mg (4 mg Intravenous Given 12/26/22 0716)   iohexoL (OMNIPAQUE 350) injection 100 mL (100 mLs Intravenous Given 12/26/22 0715)       DIAGNOSIS:    ICD-10-CM ICD-9-CM   1. Hypertensive emergency  I16.1 401.9   2. Hypertension  I10 401.9   3. Hyperglycemia  R73.9 790.29   4. Other complicated headache syndrome  G44.59 339.44       ED Prescriptions       Medication Sig Dispense Start Date End Date Auth. Provider    lisinopriL 10 MG tablet Take 1 tablet (10 mg total) by  mouth once daily. 90 tablet 12/26/2022 12/26/2023 Silas Gallo MD              Follow-up Information       Follow up With Specialties Details Why Contact Info    Maicol Rizo MD Family Medicine Schedule an appointment as soon as possible for a visit in 1 week  [address] [facility] [place]  [place] [id number] [phone]

## 2023-06-08 ENCOUNTER — TELEPHONE (OUTPATIENT)
Dept: UROGYNECOLOGY | Facility: CLINIC | Age: 53
End: 2023-06-08
Payer: COMMERCIAL

## 2023-06-08 NOTE — TELEPHONE ENCOUNTER
----- Message from Zulema Esparza sent at 6/8/2023 10:17 AM CDT -----  Contact: Patient  Type:  Patient Call          Who Called: patient         Does the patient know what this is regarding?: requesting a call back to have a appt scheduled ;please advise           Would the patient rather a call back or a response via [facility]? Call           Best Call Back Number:[phone] (home) [phone] (work)             Additional Information:

## 2023-06-21 ENCOUNTER — HOSPITAL ENCOUNTER (OUTPATIENT)
Dept: RADIOLOGY | Facility: OTHER | Age: 53
Discharge: HOME | End: 2023-06-21
Attending: INTERNAL MEDICINE
Payer: COMMERCIAL

## 2023-06-21 ENCOUNTER — OFFICE VISIT (OUTPATIENT)
Dept: UROGYNECOLOGY | Facility: CLINIC | Age: 53
End: 2023-06-21
Payer: COMMERCIAL

## 2023-06-21 VITALS
SYSTOLIC BLOOD PRESSURE: 133 MMHG | WEIGHT: 206.56 LBS | HEART RATE: 93 BPM | BODY MASS INDEX: 31.3 KG/M2 | HEIGHT: 68 IN | DIASTOLIC BLOOD PRESSURE: 81 MMHG

## 2023-06-21 DIAGNOSIS — N89.8 VAGINAL DISCHARGE: ICD-10-CM

## 2023-06-21 DIAGNOSIS — N39.46 MIXED STRESS AND URGE URINARY INCONTINENCE: ICD-10-CM

## 2023-06-21 DIAGNOSIS — R39.9 UTI SYMPTOMS: Primary | ICD-10-CM

## 2023-06-21 DIAGNOSIS — R16.0 HEPATOMEGALY: ICD-10-CM

## 2023-06-21 LAB
MICROSCOPIC COMMENT: NORMAL
RBC #/AREA URNS AUTO: 0 /HPF (ref 0–4)
SQUAMOUS #/AREA URNS AUTO: 0 /HPF
WBC #/AREA URNS AUTO: 0 /HPF (ref 0–5)

## 2023-06-21 PROCEDURE — 3079F PR MOST RECENT DIASTOLIC BLOOD PRESSURE 80-89 MM HG: ICD-10-PCS | Mod: CPTII,S$GLB,, | Performed by: PHYSICIAN ASSISTANT

## 2023-06-21 PROCEDURE — 3075F PR MOST RECENT SYSTOLIC BLOOD PRESS GE 130-139MM HG: ICD-10-PCS | Mod: CPTII,S$GLB,, | Performed by: PHYSICIAN ASSISTANT

## 2023-06-21 PROCEDURE — 1159F PR MEDICATION LIST DOCUMENTED IN MEDICAL RECORD: ICD-10-PCS | Mod: CPTII,S$GLB,, | Performed by: PHYSICIAN ASSISTANT

## 2023-06-21 PROCEDURE — 99215 OFFICE O/P EST HI 40 MIN: CPT | Mod: S$GLB,,, | Performed by: PHYSICIAN ASSISTANT

## 2023-06-21 PROCEDURE — 81514 NFCT DS BV&VAGINITIS DNA ALG: CPT | Performed by: PHYSICIAN ASSISTANT

## 2023-06-21 PROCEDURE — 76981 USE PARENCHYMA: CPT | Mod: TC

## 2023-06-21 PROCEDURE — 3075F SYST BP GE 130 - 139MM HG: CPT | Mod: CPTII,S$GLB,, | Performed by: PHYSICIAN ASSISTANT

## 2023-06-21 PROCEDURE — 99215 PR OFFICE/OUTPT VISIT, EST, LEVL V, 40-54 MIN: ICD-10-PCS | Mod: S$GLB,,, | Performed by: PHYSICIAN ASSISTANT

## 2023-06-21 PROCEDURE — 99999 PR PBB SHADOW E&M-EST. PATIENT-LVL IV: ICD-10-PCS | Mod: PBBFAC,,, | Performed by: PHYSICIAN ASSISTANT

## 2023-06-21 PROCEDURE — 99999 PR PBB SHADOW E&M-EST. PATIENT-LVL IV: CPT | Mod: PBBFAC,,, | Performed by: PHYSICIAN ASSISTANT

## 2023-06-21 PROCEDURE — 87086 URINE CULTURE/COLONY COUNT: CPT | Performed by: PHYSICIAN ASSISTANT

## 2023-06-21 PROCEDURE — 81001 URINALYSIS AUTO W/SCOPE: CPT | Performed by: PHYSICIAN ASSISTANT

## 2023-06-21 PROCEDURE — 76981 USE PARENCHYMA: CPT | Mod: 26,,, | Performed by: RADIOLOGY

## 2023-06-21 PROCEDURE — 4010F ACE/ARB THERAPY RXD/TAKEN: CPT | Mod: CPTII,S$GLB,, | Performed by: PHYSICIAN ASSISTANT

## 2023-06-21 PROCEDURE — 1159F MED LIST DOCD IN RCRD: CPT | Mod: CPTII,S$GLB,, | Performed by: PHYSICIAN ASSISTANT

## 2023-06-21 PROCEDURE — 4010F PR ACE/ARB THEARPY RXD/TAKEN: ICD-10-PCS | Mod: CPTII,S$GLB,, | Performed by: PHYSICIAN ASSISTANT

## 2023-06-21 PROCEDURE — 3008F PR BODY MASS INDEX (BMI) DOCUMENTED: ICD-10-PCS | Mod: CPTII,S$GLB,, | Performed by: PHYSICIAN ASSISTANT

## 2023-06-21 PROCEDURE — 3008F BODY MASS INDEX DOCD: CPT | Mod: CPTII,S$GLB,, | Performed by: PHYSICIAN ASSISTANT

## 2023-06-21 PROCEDURE — 76981 US ELASTOGRAPHY LIVER W/ IMAGING: ICD-10-PCS | Mod: 26,,, | Performed by: RADIOLOGY

## 2023-06-21 PROCEDURE — 3079F DIAST BP 80-89 MM HG: CPT | Mod: CPTII,S$GLB,, | Performed by: PHYSICIAN ASSISTANT

## 2023-06-21 RX ORDER — OXYBUTYNIN CHLORIDE 10 MG/1
10 TABLET, EXTENDED RELEASE ORAL
COMMUNITY
Start: 2023-05-01

## 2023-06-21 RX ORDER — PRAVASTATIN SODIUM 20 MG/1
20 TABLET ORAL
COMMUNITY
Start: 2023-06-14

## 2023-06-21 RX ORDER — CIPROFLOXACIN 500 MG/1
500 TABLET ORAL 2 TIMES DAILY
COMMUNITY
Start: 2023-04-05 | End: 2023-06-26 | Stop reason: ALTCHOICE

## 2023-06-21 RX ORDER — CODEINE PHOSPHATE AND GUAIFENESIN 10; 100 MG/5ML; MG/5ML
SOLUTION ORAL
COMMUNITY
Start: 2023-06-06 | End: 2023-06-26

## 2023-06-21 RX ORDER — NITROFURANTOIN 25; 75 MG/1; MG/1
100 CAPSULE ORAL EVERY 12 HOURS
COMMUNITY
Start: 2023-05-27 | End: 2023-06-26 | Stop reason: ALTCHOICE

## 2023-06-21 RX ORDER — FLUCONAZOLE 150 MG/1
150 TABLET ORAL ONCE
COMMUNITY
Start: 2023-05-18 | End: 2023-06-26 | Stop reason: ALTCHOICE

## 2023-06-21 RX ORDER — BENZONATATE 100 MG/1
100 CAPSULE ORAL 3 TIMES DAILY
COMMUNITY
Start: 2023-01-23 | End: 2023-06-26 | Stop reason: ALTCHOICE

## 2023-06-21 RX ORDER — PANTOPRAZOLE SODIUM 40 MG/1
40 TABLET, DELAYED RELEASE ORAL
COMMUNITY
Start: 2023-03-22

## 2023-06-21 RX ORDER — SULFAMETHOXAZOLE AND TRIMETHOPRIM 800; 160 MG/1; MG/1
1 TABLET ORAL 2 TIMES DAILY
COMMUNITY
Start: 2023-05-18 | End: 2023-06-26

## 2023-06-21 RX ORDER — FLUTICASONE PROPIONATE 50 MCG
SPRAY, SUSPENSION (ML) NASAL
COMMUNITY
Start: 2023-03-01 | End: 2023-06-26 | Stop reason: ALTCHOICE

## 2023-06-21 RX ORDER — CEFDINIR 300 MG/1
300 CAPSULE ORAL 2 TIMES DAILY
COMMUNITY
Start: 2023-03-01 | End: 2023-06-26

## 2023-06-21 RX ORDER — MELOXICAM 15 MG/1
15 TABLET ORAL
COMMUNITY
Start: 2023-01-06 | End: 2023-06-26

## 2023-06-21 RX ORDER — FAMOTIDINE 20 MG/1
20 TABLET, FILM COATED ORAL 2 TIMES DAILY PRN
COMMUNITY
Start: 2023-05-27

## 2023-06-21 RX ORDER — HYOSCYAMINE SULFATE 0.12 MG/1
TABLET SUBLINGUAL
COMMUNITY
Start: 2023-05-27 | End: 2023-06-26

## 2023-06-21 RX ORDER — LOSARTAN POTASSIUM 50 MG/1
50 TABLET ORAL
COMMUNITY
Start: 2023-05-13

## 2023-06-21 RX ORDER — METFORMIN HYDROCHLORIDE 500 MG/1
500 TABLET, EXTENDED RELEASE ORAL 2 TIMES DAILY
COMMUNITY
Start: 2023-05-30

## 2023-06-21 RX ORDER — IBUPROFEN 800 MG/1
800 TABLET ORAL 3 TIMES DAILY
COMMUNITY
Start: 2023-05-11

## 2023-06-21 RX ORDER — SEMAGLUTIDE 1.34 MG/ML
INJECTION, SOLUTION SUBCUTANEOUS
COMMUNITY

## 2023-06-21 RX ORDER — PREDNISONE 20 MG/1
20 TABLET ORAL 2 TIMES DAILY
COMMUNITY
Start: 2023-06-06 | End: 2023-06-26

## 2023-06-21 RX ORDER — ESTRADIOL 0.1 MG/G
1 CREAM VAGINAL
Qty: 42.5 G | Refills: 11 | Status: SHIPPED | OUTPATIENT
Start: 2023-06-22

## 2023-06-21 RX ORDER — ONDANSETRON 4 MG/1
4 TABLET, ORALLY DISINTEGRATING ORAL EVERY 8 HOURS PRN
COMMUNITY
Start: 2023-05-27

## 2023-06-21 RX ORDER — BROMPHENIRAMINE MALEATE, PSEUDOEPHEDRINE HYDROCHLORIDE, AND DEXTROMETHORPHAN HYDROBROMIDE 2; 30; 10 MG/5ML; MG/5ML; MG/5ML
SYRUP ORAL
COMMUNITY
Start: 2023-06-06 | End: 2023-06-26 | Stop reason: ALTCHOICE

## 2023-06-21 NOTE — PATIENT INSTRUCTIONS
1)  Urinary urgency/frequency:    --Empty bladder every 2-3 hours even if you don't have the urge.  Empty well: wait a minute, lean forward on toilet.    --Try not to go more frequently than once an hour. When you get the urge to urinate after you have just gone, distract yourself until the urge passes.   --Avoid dietary irritants (see sheet).  Keep diary x 3-5 days to determine your irritants.  --KEGELS: do 10 in AM and 10 in PM, holding each x 10 seconds.  When you feel urge to go, STOP, KEGEL, and when urge has passed, then go to bathroom.    --Start Pelvic Floor Physical Therapy (PT).  Please call to make appointment.   [facility] (all take Medicaid):  1)  [facility]: (p) [phone]. (f) [phone]. Established patients call:  [phone].  2)  [facility] ([place] or other): [address] [place] [address]. Patients can park in the [place] entrance and it is on the first floor. (p) [phone] (Massiel Tariq, ). (f) [phone].    3)  [facility]: (p) [phone].       --URGE LEAKAGE: consider anticholinergic. Takes 2-4 weeks to see if will have effect.  For dry mouth: use Biotene mouthwash, get sour, sugar free lozenge or gum.  May increase constipation.   --STRESS LEAKAGE (leak with laugh/cough/sneeze):  Pelvic floor PT vs Pessary vs. Mid-urethral sling surgery vs Impressa vs Periurethral bulking.   -- PVR (post void residual): 60 cc  -- Hgb A1C: ? None recent on file    2)  Frequent UTI symptoms:  --urine C&S sent today from cath specimen  --If you feel like you have a UTI, please call our office so that we can place an order for you to drop off a urine specimen at the closest [facility] lab (we will arrange).     --We will call in antibiotics for you to start right after you drop off specimen.     --In this way, we can determine:     1)  Do you have a UTI?  Or is it inflammation/dryness?    2) If you have a UTI, is it sensitive to the  antibiotics we prescribed?   --follow UTI prevention tips (see attached)  --control bowel movements/fecal cross-contamination  --treat vaginal atrophy (dryness) -- see below  --empty bladder before and after intercourse  --start taking 1 cranberry tablet daily ex Theracran  --start taking 1 probiotic pill (any with lactobacillus and/or acidophilus) daily -- recommned Physician's Choice because has cranberry, D-mannose, and probiotic all in one  --start daily D-mannose  --consider need for further evaluation (pelvic imaging and cystoscopy) if issue persists -- previous normal    3) Vaginal atrophy (dryness):    A)  Vaginal estrogen:  --Use 0.5 grams of estrogen cream in vagina with applicator or dime-sized amount with finger (as far as can reach internally) nightly x 2 weeks, then twice a week thereafter.  You can also apply a dime-sized amount with your finger around the vaginal opening and inner lips at same frequency.     --Vaginal estrogen may help to decrease pain related to dryness with intercourse and urinary symptoms (urgency/frequency/UTIs) around menopause.    [email or web address]_Vaginal_Estrogen_Therapy.pdf    B) Non-estrogen options for vaginal dryness:  --Use REPLENS or REFRESH OTC: 1/2 applicator full in vagina twice a week.    --coconut oil: dime-sized amount with finger (as far as can reach internally) and around the vaginal opening and inner lips up to nightly as needed  --Uberlube, Astroglide, [place] liquibeads, Satin by Sliquid Natural Intimate Moisturizer    4) PAD CARE  -- change pad EVERY TIME you go to the restroom, even if it is not wet.  --You can use small pads/panty liners inside of diaper  -- If you are at home, try not to wear pads unless necessary.   -- Use 100% cotton pads (ex. Kotex) instead of synthetic  -- Use barrier cream (ex. A&D, Thad's Butt Paste, Aquaphor) to prevent frictional rubbing from overuse of pads     Nocturia (nighttime urination):   --stop  "fluids 2-3 hours before bed.    --limit water at night: don't keep by bed--keep in bathroom or nearby or just keep ice chips at bedside  --for dry mouth: get sugar free lozenge or Biotene mouthwash to help stimulate salivation  --hydrate well during daytime  --figure out what time major nightime leakage happens, then set alarm for 30 min-1 hour before event and empty bladder so can prevent issue  --If have leg swelling:  Elevate feet above chest x 1 hour before bed to get excess fluid off.  Can also use support hose (knee highs).      Constipation:  -- Controlling constipation may help bladder urgency/leakage and fiber may better control cholesterol and blood glucose.   -- Increase daily fiber.  Start taking 2 tsp of fiber powder (psyllium or other sugar-free powder, ex. Benefiber or Metamucil) or fiber gummies or fiber pills.  Mix in 8 oz of water.  Take x 3-5 days.  Then, increase fiber by 1 tsp every 3-5 days until stool is easy to pass.  Stop and continue at that dose.   **Do not exceed 6 tsps/day.   -- May also use over the counter stool softener (ex Colace) 1-2 x/day.  **AVOID laxatives.  -- Continue "Natural Vitality Calm" powder or magnesium oxide 400-500 mg per night (helps with sleep, anxiety, and constipation)  -- Drink plenty of water!  -- Get a SQUATTY POTTY  "

## 2023-06-21 NOTE — PROGRESS NOTES
[facility], [place] - UROGYNECOLOGY  [address] [address]     Nahomy Rio  [id number]  1970     Consulting Physician: Tomi Cruz   GYN: Catina   PCP: Maicol Rizo MD     CC: Recurrent UTI     HPI: 52 y/o  presents for recurrent UTI. No positive urine cultures on file.      1)  UI:  (+) LELA = (+) UUI  X 6months.  (--) pads. Daytime frequency: Q 1 hours.  Nocturia: Yes: 1/night.   (--) dysuria,  (--) hematuria,  (+) frequent UTIs.  (+) incomplete bladder emptying.      2)  POP:  Absent.  (+) sexually active.  (--) dyspareunia.  (+)  Vaginal dryness.  (--) vaginal estrogen use. Has used biotee pellets in the past. Stopped using due to excessive hair growth. Has never tried vaginal estrogen.      3)  BM:  (+) constipation/straining, but improved with magnesium, and fiber.  (--) chronic diarrhea. (--) hematochezia.  (--) fecal incontinence.  (--) fecal smearing/urgency.  (--) incomplete evacuation. Reports intermittent constipation since February.  Has a normal bowel movement once or twice a week.      4) Frequent UTI symptoms: Reports suprapubic pain and fullness, denies dysuria. Has stopped using poise pads for 2 weeks because she believed that is making UTI symptoms reoccur.       Past Medical History       Past Medical History:   Diagnosis Date    Allergy      Depression      High cholesterol      Hypertension      Overactive bladder      Type 2 diabetes mellitus without complications           Past Surgical History        Past Surgical History:   Procedure Laterality Date     SECTION        HYSTERECTOMY        KNEE SURGERY        OOPHORECTOMY          Hysterectomy: Yes - in , says she had a hysterectomy because her uterus was coming out of her vagina   Type: xlap   Cervix present: Unsure   Ovaries present: Yes - has one ovary, unsure which one   Other procedures at time of hysterectomy:  tummy tuck     Family History        Family History   Problem  Relation Age of Onset    Diabetes Mother      Breast cancer Mother      Diabetes Father        Colon CA: No  Breast CA: Yes - mother, diagnosed in her 70's, had double mastectomy and is in remission   GYN CA: No   CA: No     Social History  Social History          Tobacco Use   Smoking Status Never   Smokeless Tobacco Never   .  Social History           Substance and Sexual Activity   Alcohol Use Yes     Comment: socially   .    Social History          Substance and Sexual Activity   Drug Use No   .  The patient is .  Resides in [place] [address].  Employment status: currently employed  Works at [employer]      Allergies  Review of patient's allergies indicates:  No Known Allergies     Medications         Current Outpatient Medications on File Prior to Visit   Medication Sig Dispense Refill    citalopram (CELEXA) 20 MG tablet Take 20 mg by mouth once daily.         famotidine (PEPCID) 20 MG tablet Take 20 mg by mouth 2 (two) times daily as needed.        fexofenadine (ALLEGRA) 180 MG tablet Take 180 mg by mouth once daily.        levothyroxine (SYNTHROID) 50 MCG tablet Take 50 mcg by mouth before breakfast.        losartan (COZAAR) 50 MG tablet Take 50 mg by mouth.        metFORMIN (GLUCOPHAGE-XR) 500 MG ER 24hr tablet Take 500 mg by mouth 2 (two) times daily.        oxybutynin (DITROPAN-XL) 10 MG 24 hr tablet Take 10 mg by mouth.        pantoprazole (PROTONIX) 40 MG tablet Take 40 mg by mouth.        pravastatin (PRAVACHOL) 20 MG tablet Take 20 mg by mouth.        semaglutide (OZEMPIC) 1 mg/dose (2 mg/1.5 mL) PnIj          alprazolam (XANAX) 1 MG tablet Take 1 mg by mouth.        benzonatate (TESSALON) 100 MG capsule Take 100 mg by mouth 3 (three) times daily.        brompheniramine-pseudoeph-DM (BROMFED DM) 2-30-10 mg/5 mL Syrp Take by mouth.        cefdinir (OMNICEF) 300 MG capsule Take 300 mg by mouth 2 (two) times daily.        ciprofloxacin HCl (CIPRO) 500 MG tablet Take 500 mg by mouth 2 (two)  times daily.        fluconazole (DIFLUCAN) 150 MG Tab Take 150 mg by mouth once.        fluticasone propionate (FLONASE) 50 mcg/actuation nasal spray by Each Nostril route.        guaiFENesin-codeine 100-10 mg/5 ml (TUSSI-ORGANIDIN NR)  mg/5 mL syrup Take by mouth.        hyoscyamine (LEVSIN/SL) 0.125 mg Subl SMARTSI Tablet(s) Sublingual Every 6 Hours PRN        ibuprofen (ADVIL,MOTRIN) 800 MG tablet Take 800 mg by mouth 3 (three) times daily.        meloxicam (MOBIC) 15 MG tablet Take 15 mg by mouth.        nitrofurantoin, macrocrystal-monohydrate, (MACROBID) 100 MG capsule Take 100 mg by mouth every 12 (twelve) hours.        ondansetron (ZOFRAN-ODT) 4 MG TbDL Take 4 mg by mouth every 8 (eight) hours as needed.        predniSONE (DELTASONE) 20 MG tablet Take 20 mg by mouth 2 (two) times daily.        sulfamethoxazole-trimethoprim 800-160mg (BACTRIM DS) 800-160 mg Tab Take 1 tablet by mouth 2 (two) times daily.        [DISCONTINUED] lisinopriL 10 MG tablet Take 1 tablet (10 mg total) by mouth once daily. 90 tablet 3    [DISCONTINUED] meclizine (ANTIVERT) 25 mg tablet Take 1 tablet (25 mg total) by mouth 3 (three) times daily as needed. 20 tablet 0      No current facility-administered medications on file prior to visit.                          OB History    Para Term  AB Living   2 2 2     2   SAB IAB Ectopic Multiple Live Births              2          # Outcome Date GA Lbr Guy/2nd Weight Sex Delivery Anes PTL Lv   2 Term 05 38w0d     F CS-LTranv   N NORBERTO   1 Term 01 41w0d     F CS-LTranv     NORBERTO         Past OB History       Gynecologic History  LMP: No LMP recorded. Patient has had a hysterectomy.  Method of contraception: post-menopausal   Age of menarche: at age 17   Age of menopause:   Menstrual history: normal periods      Well Woman  Last pap: 2022, normal   Last mammogram: February of this year, normal   Colonoscopy: 2022   DEXA: not yet     "  Review of Systems A 14 point ROS was reviewed with pertinent positives as noted above in the history of present illness.    Constitutional: negative  Eyes: negative  Endocrine: negative  Gastrointestinal: negative  Cardiovascular: negative  Respiratory: negative  Allergic/Immunologic: negative  Integumentary: negative  Psychiatric: negative  Musculoskeletal: negative   Ear/Nose/Throat: negative  Neurologic: negative  Genitourinary: SEE HPI  Hematologic/Lymphatic: negative   Breast: negative     Urogynecologic Exam  /81 (BP Location: Right arm, Patient Position: Sitting, BP Method: Medium (Automatic))   Pulse 93   Ht 5' 8" (1.727 m)   Wt 93.7 kg (206 lb 9.1 oz)   BMI 31.41 kg/m²      GENERAL APPEARANCE:  The patient is a well-developed, well-nourished  female.  Neck:  Supple with no thyromegaly, no carotid bruits.  Heart:  Regular rate and rhythm, no murmurs, rubs or gallops.  Lungs:  Clear.  No CVA tenderness.  Abdomen:  Soft, nontender, nondistended, no hepatosplenomegaly.        PELVIC:    External genitalia:  Normal Bartholins, Skenes and labia bilaterally.    Urethra:  No caruncle, diverticulum or masses.  (+) hypermobility.    Vagina:  Atrophy (+) , no bladder masses or tender, no discharge.    Cervix:  absent  Uterus: uterus absent  Adnexa: Not palpable.     POP-Q:  Deferred.  No obvious POP present with valsalva.      NEUROLOGIC:  Cranial nerves 2 through 12 intact.  Strength 5/5.  DTRs 2+ lower extremities.  S2 through 4 normal.  Sacral reflexes intact.     EXT: HARMAN, 2+ pulses bilaterally, no C/C/E     COUGH STRESS TEST:  negative  KEGEL: 3 /5     RECTAL:    External:  Normal, (--) hemorrhoids, (--) dovetailing.   Internal: deferred     PVR:  60 mL     CT abd/pelvis  01/2023   1.  No acute findings in the abdomen or pelvis.     2.  Hepatomegaly.   No hydronephrosis     Impression  1. UTI symptoms          Initial Plan  The patient was counseled regarding these issues. She was given a summary " sheet containing each of these issues with possible options for evaluation and management.  All her questions were addressed to her satisfaction.     1)  Urinary urgency/frequency:    --Empty bladder every 2-3 hours even if you don't have the urge.  Empty well: wait a minute, lean forward on toilet.    --Try not to go more frequently than once an hour. When you get the urge to urinate after you have just gone, distract yourself until the urge passes.   --Avoid dietary irritants (see sheet).  Keep diary x 3-5 days to determine your irritants.  --KEGELS: do 10 in AM and 10 in PM, holding each x 10 seconds.  When you feel urge to go, STOP, KEGEL, and when urge has passed, then go to bathroom.    --Start Pelvic Floor Physical Therapy (PT).  Please call to make appointment.   [facility] (all take Medicaid):  1)  [facility]: (p) [phone]. (f) [phone]. Established patients call:  [phone].  2)  [facility] ([place] or other): [address] [place] [address]. Patients can park in the [place] entrance and it is on the first floor. (p) [phone] (Massiel Tariq, ). (f) [phone].    3)  [facility]: (p) [phone].       --URGE LEAKAGE: consider anticholinergic or B-3 agonist. Takes 2-4 weeks to see if will have effect.  For dry mouth: use Biotene mouthwash, get sour, sugar free lozenge or gum.  May increase constipation.   --STRESS LEAKAGE (leak with laugh/cough/sneeze):  Pelvic floor PT vs Pessary vs. Mid-urethral sling surgery vs Impressa vs Periurethral bulking.   -- PVR (post void residual): 60 cc  -- Hgb A1C:  None recent on file     2)  Frequent UTI symptoms:  --urine C&S sent today from cath specimen  --If you feel like you have a UTI, please call our office so that we can place an order for you to drop off a urine specimen at the closest [facility] lab (we will arrange).                --We will call in antibiotics for you to start right  after you drop off specimen.                --In this way, we can determine:                           1)  Do you have a UTI?  Or is it inflammation/dryness?                          2) If you have a UTI, is it sensitive to the antibiotics we prescribed?   --follow UTI prevention tips (see attached)  --control bowel movements/fecal cross-contamination  --treat vaginal atrophy (dryness) -- see below  --empty bladder before and after intercourse  --start taking 1 cranberry tablet daily ex Theracran  --start taking 1 probiotic pill (any with lactobacillus and/or acidophilus) daily -- recommned Physician's Choice because has cranberry, D-mannose, and probiotic all in one  --start daily D-mannose  --consider need for further evaluation (pelvic imaging and cystoscopy) if issue persists -- previous normal     3) Vaginal atrophy (dryness):    A)  Vaginal estrogen:  --Use 0.5 grams of estrogen cream in vagina with applicator or dime-sized amount with finger (as far as can reach internally) nightly x 2 weeks, then twice a week thereafter.  You can also apply a dime-sized amount with your finger around the vaginal opening and inner lips at same frequency.                           --Vaginal estrogen may help to decrease pain related to dryness with intercourse and urinary symptoms (urgency/frequency/UTIs) around menopause.               [email or web address]-Dose_Vaginal_Estrogen_Therapy.pdf     B) Non-estrogen options for vaginal dryness:  --Use REPLENS or REFRESH OTC: 1/2 applicator full in vagina twice a week.    --coconut oil: dime-sized amount with finger (as far as can reach internally) and around the vaginal opening and inner lips up to nightly as needed  --Uberlube, Astroglide, [place] liquibeads, Satin by Sliquid Natural Intimate Moisturizer     4) PAD CARE  -- change pad EVERY TIME you go to the restroom, even if it is not wet.  --You can use small pads/panty liners inside of diaper  -- If you are at  "home, try not to wear pads unless necessary.   -- Use 100% cotton pads (ex. Kotex) instead of synthetic  -- Use barrier cream (ex. A&D, Thad's Butt Paste, Aquaphor) to prevent frictional rubbing from overuse of pads      Nocturia (nighttime urination):   --stop fluids 2-3 hours before bed.    --limit water at night: don't keep by bed--keep in bathroom or nearby or just keep ice chips at bedside  --for dry mouth: get sugar free lozenge or Biotene mouthwash to help stimulate salivation  --hydrate well during daytime  --figure out what time major nightime leakage happens, then set alarm for 30 min-1 hour before event and empty bladder so can prevent issue  --If have leg swelling:  Elevate feet above chest x 1 hour before bed to get excess fluid off.  Can also use support hose (knee highs).       Constipation:  -- Controlling constipation may help bladder urgency/leakage and fiber may better control cholesterol and blood glucose.   -- Increase daily fiber.  Start taking 2 tsp of fiber powder (psyllium or other sugar-free powder, ex. Benefiber or Metamucil) or fiber gummies or fiber pills.  Mix in 8 oz of water.  Take x 3-5 days.  Then, increase fiber by 1 tsp every 3-5 days until stool is easy to pass.  Stop and continue at that dose.   **Do not exceed 6 tsps/day.   -- May also use over the counter stool softener (ex Colace) 1-2 x/day.  **AVOID laxatives.  -- Continue "Natural Vitality Calm" powder or magnesium oxide 400-500 mg per night (helps with sleep, anxiety, and constipation)  -- Drink plenty of water!  -- Get a SQUATTY POTTY     RTC in 3 months after starting pelvic floor PT and vaginal estrogen cream     Approximately 60 min were spent in consult, 90 % in discussion.      Thank you for requesting consultation of your patient.  I look forward to participating in her care.     Emanuel Velasquez PA-C  Division of Female Pelvic Medicine and Reconstructive Surgery  Department of Obstetrics and Gynecology  [facility] " [facility], [place]

## 2023-06-21 NOTE — PROGRESS NOTES
[facility] - UROGYNECOLOGY  [address] [address]    Nahomy Rio  [id number]  1970    Consulting Physician: Tomi Cruz   GYN: Catina   PCP: Maicol Rizo MD    CC: Recurrent UTI    HPI: 52 y/o  presents for recurrent UTI. No positive urine cultures on file.     1)  UI:  (+) LELA = (+) UUI  X 6months.  (--) pads. Daytime frequency: Q 1 hours.  Nocturia: Yes: 1/night.   (--) dysuria,  (--) hematuria,  (+) frequent UTIs.  (+) incomplete bladder emptying.     2)  POP:  Absent.  (+) sexually active.  (--) dyspareunia.  (+)  Vaginal dryness.  (--) vaginal estrogen use. Has used biotee pellets in the past. Stopped using due to excessive hair growth. Has never tried vaginal estrogen.     3)  BM:  (+) constipation/straining, but improved with magnesium, and fiber.  (--) chronic diarrhea. (--) hematochezia.  (--) fecal incontinence.  (--) fecal smearing/urgency.  (--) incomplete evacuation. Reports intermittent constipation since February.  Has a normal bowel movement once or twice a week.     4) Frequent UTI symptoms: Reports suprapubic pain and fullness, denies dysuria. Has stopped using poise pads for 2 weeks because she believed that is making UTI symptoms reoccur.      Past Medical History  Past Medical History:   Diagnosis Date    Allergy     Depression     High cholesterol     Hypertension     Overactive bladder     Type 2 diabetes mellitus without complications         Past Surgical History  Past Surgical History:   Procedure Laterality Date     SECTION      HYSTERECTOMY      KNEE SURGERY      OOPHORECTOMY        Hysterectomy: Yes - in , says she had a hysterectomy because her uterus was coming out of her vagina   Type: xlap   Cervix present: Unsure   Ovaries present: Yes - has one ovary, unsure which one   Other procedures at time of hysterectomy:  tummy tuck    Family History  Family History   Problem Relation Age of Onset    Diabetes Mother      Breast cancer Mother     Diabetes Father       Colon CA: No  Breast CA: Yes - mother, diagnosed in her 70's, had double mastectomy and is in remission   GYN CA: No   CA: No    Social History  Social History     Tobacco Use   Smoking Status Never   Smokeless Tobacco Never   .  Social History     Substance and Sexual Activity   Alcohol Use Yes    Comment: socially   .    Social History     Substance and Sexual Activity   Drug Use No   .  The patient is .  Resides in [place] [address].  Employment status: currently employed  Works at [employer]     Allergies  Review of patient's allergies indicates:  No Known Allergies    Medications  Current Outpatient Medications on File Prior to Visit   Medication Sig Dispense Refill    citalopram (CELEXA) 20 MG tablet Take 20 mg by mouth once daily.       famotidine (PEPCID) 20 MG tablet Take 20 mg by mouth 2 (two) times daily as needed.      fexofenadine (ALLEGRA) 180 MG tablet Take 180 mg by mouth once daily.      levothyroxine (SYNTHROID) 50 MCG tablet Take 50 mcg by mouth before breakfast.      losartan (COZAAR) 50 MG tablet Take 50 mg by mouth.      metFORMIN (GLUCOPHAGE-XR) 500 MG ER 24hr tablet Take 500 mg by mouth 2 (two) times daily.      oxybutynin (DITROPAN-XL) 10 MG 24 hr tablet Take 10 mg by mouth.      pantoprazole (PROTONIX) 40 MG tablet Take 40 mg by mouth.      pravastatin (PRAVACHOL) 20 MG tablet Take 20 mg by mouth.      semaglutide (OZEMPIC) 1 mg/dose (2 mg/1.5 mL) PnIj       alprazolam (XANAX) 1 MG tablet Take 1 mg by mouth.      benzonatate (TESSALON) 100 MG capsule Take 100 mg by mouth 3 (three) times daily.      brompheniramine-pseudoeph-DM (BROMFED DM) 2-30-10 mg/5 mL Syrp Take by mouth.      cefdinir (OMNICEF) 300 MG capsule Take 300 mg by mouth 2 (two) times daily.      ciprofloxacin HCl (CIPRO) 500 MG tablet Take 500 mg by mouth 2 (two) times daily.      fluconazole (DIFLUCAN) 150 MG Tab Take 150 mg by mouth once.      fluticasone  propionate (FLONASE) 50 mcg/actuation nasal spray by Each Nostril route.      guaiFENesin-codeine 100-10 mg/5 ml (TUSSI-ORGANIDIN NR)  mg/5 mL syrup Take by mouth.      hyoscyamine (LEVSIN/SL) 0.125 mg Subl SMARTSI Tablet(s) Sublingual Every 6 Hours PRN      ibuprofen (ADVIL,MOTRIN) 800 MG tablet Take 800 mg by mouth 3 (three) times daily.      meloxicam (MOBIC) 15 MG tablet Take 15 mg by mouth.      nitrofurantoin, macrocrystal-monohydrate, (MACROBID) 100 MG capsule Take 100 mg by mouth every 12 (twelve) hours.      ondansetron (ZOFRAN-ODT) 4 MG TbDL Take 4 mg by mouth every 8 (eight) hours as needed.      predniSONE (DELTASONE) 20 MG tablet Take 20 mg by mouth 2 (two) times daily.      sulfamethoxazole-trimethoprim 800-160mg (BACTRIM DS) 800-160 mg Tab Take 1 tablet by mouth 2 (two) times daily.      [DISCONTINUED] lisinopriL 10 MG tablet Take 1 tablet (10 mg total) by mouth once daily. 90 tablet 3    [DISCONTINUED] meclizine (ANTIVERT) 25 mg tablet Take 1 tablet (25 mg total) by mouth 3 (three) times daily as needed. 20 tablet 0     No current facility-administered medications on file prior to visit.       OB History    Para Term  AB Living   2 2 2     2   SAB IAB Ectopic Multiple Live Births           2      # Outcome Date GA Lbr Guy/2nd Weight Sex Delivery Anes PTL Lv   2 Term 05 38w0d   F CS-LTranv  N NORBERTO   1 Term 01 41w0d   F CS-LTranv   NORBERTO        Past OB History       Gynecologic History  LMP: No LMP recorded. Patient has had a hysterectomy.  Method of contraception: post-menopausal   Age of menarche: at age 17   Age of menopause:   Menstrual history: normal periods     Well Woman  Last pap: 2022, normal   Last mammogram: February of this year, normal   Colonoscopy: 2022   DEXA: not yet     Review of Systems A 14 point ROS was reviewed with pertinent positives as noted above in the history of present illness.    Constitutional: negative  Eyes:  "negative  Endocrine: negative  Gastrointestinal: negative  Cardiovascular: negative  Respiratory: negative  Allergic/Immunologic: negative  Integumentary: negative  Psychiatric: negative  Musculoskeletal: negative   Ear/Nose/Throat: negative  Neurologic: negative  Genitourinary: SEE HPI  Hematologic/Lymphatic: negative   Breast: negative    Urogynecologic Exam  /81 (BP Location: Right arm, Patient Position: Sitting, BP Method: Medium (Automatic))   Pulse 93   Ht 5' 8" (1.727 m)   Wt 93.7 kg (206 lb 9.1 oz)   BMI 31.41 kg/m²     GENERAL APPEARANCE:  The patient is a well-developed, well-nourished  female.  Neck:  Supple with no thyromegaly, no carotid bruits.  Heart:  Regular rate and rhythm, no murmurs, rubs or gallops.  Lungs:  Clear.  No CVA tenderness.  Abdomen:  Soft, nontender, nondistended, no hepatosplenomegaly.      PELVIC:    External genitalia:  Normal Bartholins, Skenes and labia bilaterally.    Urethra:  No caruncle, diverticulum or masses.  (+) hypermobility.    Vagina:  Atrophy (+) , no bladder masses or tender, no discharge.    Cervix:  absent  Uterus: uterus absent  Adnexa: Not palpable.    POP-Q:  Deferred.  No obvious POP present with valsalva.     NEUROLOGIC:  Cranial nerves 2 through 12 intact.  Strength 5/5.  DTRs 2+ lower extremities.  S2 through 4 normal.  Sacral reflexes intact.    EXT: HARMAN, 2+ pulses bilaterally, no C/C/E    COUGH STRESS TEST:  negative  KEGEL: 3 /5    RECTAL:    External:  Normal, (--) hemorrhoids, (--) dovetailing.   Internal: deferred    PVR:  60 mL    CT abd/pelvis  01/2023   1.  No acute findings in the abdomen or pelvis.     2.  Hepatomegaly.   No hydronephrosis    Impression  1. UTI symptoms        Initial Plan  The patient was counseled regarding these issues. She was given a summary sheet containing each of these issues with possible options for evaluation and management.  All her questions were addressed to her satisfaction.     1)  Urinary " urgency/frequency:    --Empty bladder every 2-3 hours even if you don't have the urge.  Empty well: wait a minute, lean forward on toilet.    --Try not to go more frequently than once an hour. When you get the urge to urinate after you have just gone, distract yourself until the urge passes.   --Avoid dietary irritants (see sheet).  Keep diary x 3-5 days to determine your irritants.  --KEGELS: do 10 in AM and 10 in PM, holding each x 10 seconds.  When you feel urge to go, STOP, KEGEL, and when urge has passed, then go to bathroom.    --Start Pelvic Floor Physical Therapy (PT).  Please call to make appointment.   [facility] (all take Medicaid):  1)  [facility]: (p) [phone]. (f) [phone]. Established patients call:  [phone].  2)  [facility] ([place] Oseguera or other): [address] [place] [address]. Patients can park in the [address] entrance and it is on the first floor. (p) [phone] (Massiel Tariq, ). (f) [phone].    3)  [facility]: (p) [phone].       --URGE LEAKAGE: consider anticholinergic or B-3 agonist. Takes 2-4 weeks to see if will have effect.  For dry mouth: use Biotene mouthwash, get sour, sugar free lozenge or gum.  May increase constipation.   --STRESS LEAKAGE (leak with laugh/cough/sneeze):  Pelvic floor PT vs Pessary vs. Mid-urethral sling surgery vs Impressa vs Periurethral bulking.   -- PVR (post void residual): 60 cc  -- Hgb A1C:  None recent on file    2)  Frequent UTI symptoms:  --urine C&S sent today from cath specimen  --If you feel like you have a UTI, please call our office so that we can place an order for you to drop off a urine specimen at the closest [facility] lab (we will arrange).     --We will call in antibiotics for you to start right after you drop off specimen.     --In this way, we can determine:     1)  Do you have a UTI?  Or is it inflammation/dryness?    2) If you have a UTI, is it sensitive to the  antibiotics we prescribed?   --follow UTI prevention tips (see attached)  --control bowel movements/fecal cross-contamination  --treat vaginal atrophy (dryness) -- see below  --empty bladder before and after intercourse  --start taking 1 cranberry tablet daily ex Theracran  --start taking 1 probiotic pill (any with lactobacillus and/or acidophilus) daily -- recommned Physician's Choice because has cranberry, D-mannose, and probiotic all in one  --start daily D-mannose  --consider need for further evaluation (pelvic imaging and cystoscopy) if issue persists -- previous normal    3) Vaginal atrophy (dryness):    A)  Vaginal estrogen:  --Use 0.5 grams of estrogen cream in vagina with applicator or dime-sized amount with finger (as far as can reach internally) nightly x 2 weeks, then twice a week thereafter.  You can also apply a dime-sized amount with your finger around the vaginal opening and inner lips at same frequency.     --Vaginal estrogen may help to decrease pain related to dryness with intercourse and urinary symptoms (urgency/frequency/UTIs) around menopause.    [email or web address]    B) Non-estrogen options for vaginal dryness:  --Use REPLENS or REFRESH OTC: 1/2 applicator full in vagina twice a week.    --coconut oil: dime-sized amount with finger (as far as can reach internally) and around the vaginal opening and inner lips up to nightly as needed  --Uberlube, Astroglide, [place] liquibeads, Satin by Sliquid Natural Intimate Moisturizer    4) PAD CARE  -- change pad EVERY TIME you go to the restroom, even if it is not wet.  --You can use small pads/panty liners inside of diaper  -- If you are at home, try not to wear pads unless necessary.   -- Use 100% cotton pads (ex. Kotex) instead of synthetic  -- Use barrier cream (ex. A&D, Thad's Butt Paste, Aquaphor) to prevent frictional rubbing from overuse of pads     Nocturia (nighttime urination):   --stop  "fluids 2-3 hours before bed.    --limit water at night: don't keep by bed--keep in bathroom or nearby or just keep ice chips at bedside  --for dry mouth: get sugar free lozenge or Biotene mouthwash to help stimulate salivation  --hydrate well during daytime  --figure out what time major nightime leakage happens, then set alarm for 30 min-1 hour before event and empty bladder so can prevent issue  --If have leg swelling:  Elevate feet above chest x 1 hour before bed to get excess fluid off.  Can also use support hose (knee highs).      Constipation:  -- Controlling constipation may help bladder urgency/leakage and fiber may better control cholesterol and blood glucose.   -- Increase daily fiber.  Start taking 2 tsp of fiber powder (psyllium or other sugar-free powder, ex. Benefiber or Metamucil) or fiber gummies or fiber pills.  Mix in 8 oz of water.  Take x 3-5 days.  Then, increase fiber by 1 tsp every 3-5 days until stool is easy to pass.  Stop and continue at that dose.   **Do not exceed 6 tsps/day.   -- May also use over the counter stool softener (ex Colace) 1-2 x/day.  **AVOID laxatives.  -- Continue "Natural Vitality Calm" powder or magnesium oxide 400-500 mg per night (helps with sleep, anxiety, and constipation)  -- Drink plenty of water!  -- Get a SQUATTY POTTY    RTC in 3 months after starting pelvic floor PT and vaginal estrogen cream    Approximately 60 min were spent in consult, 90 % in discussion.     Thank you for requesting consultation of your patient.  I look forward to participating in her care.    Emanuel Velasquez PA-C  Division of Female Pelvic Medicine and Reconstructive Surgery  Department of Obstetrics and Gynecology  [facility] [place], [place]      "

## 2023-06-22 LAB
BACTERIA UR CULT: NO GROWTH
BACTERIAL VAGINOSIS DNA: NEGATIVE
CANDIDA GLABRATA DNA: NEGATIVE
CANDIDA KRUSEI DNA: NEGATIVE
CANDIDA RRNA VAG QL PROBE: NEGATIVE
T VAGINALIS RRNA GENITAL QL PROBE: NEGATIVE

## 2023-06-23 ENCOUNTER — TELEPHONE (OUTPATIENT)
Dept: UROLOGY | Facility: CLINIC | Age: 53
End: 2023-06-23
Payer: COMMERCIAL

## 2023-06-23 NOTE — TELEPHONE ENCOUNTER
Reviewed w/patient.  ----- Message from Emanuel Velasquez PA-C sent at 6/23/2023  2:55 PM CDT -----  Please pet patient know that vaginal swab was negative for yeast and bacterial overgrowth.     Thanks,   Emanuel

## 2023-06-29 ENCOUNTER — PATIENT MESSAGE (OUTPATIENT)
Dept: UROGYNECOLOGY | Facility: CLINIC | Age: 53
End: 2023-06-29
Payer: COMMERCIAL

## 2023-06-29 DIAGNOSIS — R39.9 UTI SYMPTOMS: Primary | ICD-10-CM

## 2023-06-30 ENCOUNTER — PATIENT MESSAGE (OUTPATIENT)
Dept: UROGYNECOLOGY | Facility: CLINIC | Age: 53
End: 2023-06-30
Payer: COMMERCIAL

## 2023-06-30 ENCOUNTER — LAB VISIT (OUTPATIENT)
Dept: LAB | Facility: HOSPITAL | Age: 53
End: 2023-06-30
Attending: PHYSICIAN ASSISTANT
Payer: COMMERCIAL

## 2023-06-30 DIAGNOSIS — R39.9 UTI SYMPTOMS: Primary | ICD-10-CM

## 2023-06-30 DIAGNOSIS — R39.9 UTI SYMPTOMS: ICD-10-CM

## 2023-06-30 LAB
BILIRUB UR QL STRIP: NEGATIVE
CLARITY UR REFRACT.AUTO: ABNORMAL
COLOR UR AUTO: YELLOW
GLUCOSE UR QL STRIP: NEGATIVE
HGB UR QL STRIP: ABNORMAL
KETONES UR QL STRIP: NEGATIVE
LEUKOCYTE ESTERASE UR QL STRIP: ABNORMAL
MICROSCOPIC COMMENT: ABNORMAL
NITRITE UR QL STRIP: NEGATIVE
PH UR STRIP: 6 [PH] (ref 5–8)
PROT UR QL STRIP: NEGATIVE
RBC #/AREA URNS AUTO: 10 /HPF (ref 0–4)
SP GR UR STRIP: 1.02 (ref 1–1.03)
URN SPEC COLLECT METH UR: ABNORMAL
UROBILINOGEN UR STRIP-ACNC: NEGATIVE EU/DL
WBC #/AREA URNS AUTO: >100 /HPF (ref 0–5)

## 2023-06-30 PROCEDURE — 87086 URINE CULTURE/COLONY COUNT: CPT | Mod: PO | Performed by: PHYSICIAN ASSISTANT

## 2023-06-30 PROCEDURE — 81000 URINALYSIS NONAUTO W/SCOPE: CPT | Mod: PO | Performed by: PHYSICIAN ASSISTANT

## 2023-06-30 PROCEDURE — 87077 CULTURE AEROBIC IDENTIFY: CPT | Mod: PO | Performed by: PHYSICIAN ASSISTANT

## 2023-06-30 PROCEDURE — 87186 SC STD MICRODIL/AGAR DIL: CPT | Mod: PO | Performed by: PHYSICIAN ASSISTANT

## 2023-06-30 PROCEDURE — 87088 URINE BACTERIA CULTURE: CPT | Mod: PO | Performed by: PHYSICIAN ASSISTANT

## 2023-06-30 RX ORDER — METHENAMINE, SODIUM PHOSPHATE, MONOBASIC, MONOHYDRATE, PHENYL SALICYLATE, METHYLENE BLUE, AND HYOSCYAMINE SULFATE 118; 40.8; 36; 10; .12 MG/1; MG/1; MG/1; MG/1; MG/1
1 CAPSULE ORAL 4 TIMES DAILY PRN
Qty: 20 CAPSULE | Refills: 1 | Status: SHIPPED | OUTPATIENT
Start: 2023-06-30 | End: 2024-01-22 | Stop reason: SDUPTHER

## 2023-06-30 RX ORDER — NITROFURANTOIN 25; 75 MG/1; MG/1
100 CAPSULE ORAL 2 TIMES DAILY
Qty: 10 CAPSULE | Refills: 0 | Status: SHIPPED | OUTPATIENT
Start: 2023-06-30 | End: 2023-07-05

## 2023-07-02 LAB — BACTERIA UR CULT: ABNORMAL

## 2023-07-03 ENCOUNTER — PATIENT MESSAGE (OUTPATIENT)
Dept: UROGYNECOLOGY | Facility: CLINIC | Age: 53
End: 2023-07-03
Payer: COMMERCIAL

## 2023-07-03 DIAGNOSIS — R39.9 UTI SYMPTOMS: Primary | ICD-10-CM

## 2023-07-10 ENCOUNTER — PATIENT MESSAGE (OUTPATIENT)
Dept: UROGYNECOLOGY | Facility: CLINIC | Age: 53
End: 2023-07-10
Payer: COMMERCIAL

## 2023-07-10 ENCOUNTER — LAB VISIT (OUTPATIENT)
Dept: LAB | Facility: HOSPITAL | Age: 53
End: 2023-07-10
Attending: PHYSICIAN ASSISTANT
Payer: COMMERCIAL

## 2023-07-10 DIAGNOSIS — R39.9 UTI SYMPTOMS: Primary | ICD-10-CM

## 2023-07-10 DIAGNOSIS — R39.9 UTI SYMPTOMS: ICD-10-CM

## 2023-07-10 LAB
BACTERIA #/AREA URNS AUTO: ABNORMAL /HPF
BILIRUB UR QL STRIP: NEGATIVE
CLARITY UR REFRACT.AUTO: CLEAR
COLOR UR AUTO: YELLOW
GLUCOSE UR QL STRIP: NEGATIVE
HGB UR QL STRIP: ABNORMAL
HYALINE CASTS UR QL AUTO: 0 /LPF
KETONES UR QL STRIP: NEGATIVE
LEUKOCYTE ESTERASE UR QL STRIP: ABNORMAL
MICROSCOPIC COMMENT: ABNORMAL
NITRITE UR QL STRIP: POSITIVE
PH UR STRIP: 6 [PH] (ref 5–8)
PROT UR QL STRIP: NEGATIVE
RBC #/AREA URNS AUTO: 15 /HPF (ref 0–4)
SP GR UR STRIP: 1.01 (ref 1–1.03)
URN SPEC COLLECT METH UR: ABNORMAL
UROBILINOGEN UR STRIP-ACNC: NEGATIVE EU/DL
WBC #/AREA URNS AUTO: 50 /HPF (ref 0–5)
WBC CLUMPS UR QL AUTO: ABNORMAL

## 2023-07-10 PROCEDURE — 81000 URINALYSIS NONAUTO W/SCOPE: CPT | Mod: PO | Performed by: PHYSICIAN ASSISTANT

## 2023-07-10 PROCEDURE — 87088 URINE BACTERIA CULTURE: CPT | Mod: PO | Performed by: PHYSICIAN ASSISTANT

## 2023-07-10 PROCEDURE — 87186 SC STD MICRODIL/AGAR DIL: CPT | Mod: PO | Performed by: PHYSICIAN ASSISTANT

## 2023-07-10 PROCEDURE — 87077 CULTURE AEROBIC IDENTIFY: CPT | Mod: PO | Performed by: PHYSICIAN ASSISTANT

## 2023-07-10 PROCEDURE — 87086 URINE CULTURE/COLONY COUNT: CPT | Mod: PO | Performed by: PHYSICIAN ASSISTANT

## 2023-07-11 ENCOUNTER — PATIENT MESSAGE (OUTPATIENT)
Dept: UROGYNECOLOGY | Facility: CLINIC | Age: 53
End: 2023-07-11
Payer: COMMERCIAL

## 2023-07-11 RX ORDER — PHENAZOPYRIDINE HYDROCHLORIDE 200 MG/1
200 TABLET, FILM COATED ORAL 3 TIMES DAILY PRN
Qty: 10 TABLET | Refills: 0 | Status: SHIPPED | OUTPATIENT
Start: 2023-07-11 | End: 2023-09-27 | Stop reason: SDUPTHER

## 2023-07-11 RX ORDER — CIPROFLOXACIN 500 MG/1
500 TABLET ORAL EVERY 12 HOURS
Qty: 14 TABLET | Refills: 0 | Status: SHIPPED | OUTPATIENT
Start: 2023-07-11 | End: 2023-07-18

## 2023-07-13 ENCOUNTER — PATIENT MESSAGE (OUTPATIENT)
Dept: UROGYNECOLOGY | Facility: CLINIC | Age: 53
End: 2023-07-13
Payer: COMMERCIAL

## 2023-07-13 LAB — BACTERIA UR CULT: ABNORMAL

## 2023-09-22 NOTE — PROGRESS NOTES
[facility] - UROGYNECOLOGY  [address] [address]  2023     Nahomy Locoid  [id number]  1970    UROGYN FOLLOW-UP  2023     53 y.o. female,   presents for urogyn follow up. She c/o   Chief Complaint   Patient presents with    uti symptoms    .     Last HPI from 2023:  HPI: 52 y/o  presents for recurrent UTI. No positive urine cultures on file.      1)  UI:  (+) LELA = (+) UUI  X 6months.  (--) pads. Daytime frequency: Q 1 hours.  Nocturia: Yes: 1/night.   (--) dysuria,  (--) hematuria,  (+) frequent UTIs.  (+) incomplete bladder emptying.      2)  POP:  Absent.  (+) sexually active.  (--) dyspareunia.  (+)  Vaginal dryness.  (--) vaginal estrogen use. Has used biotee pellets in the past. Stopped using due to excessive hair growth. Has never tried vaginal estrogen.      3)  BM:  (+) constipation/straining, but improved with magnesium, and fiber.  (--) chronic diarrhea. (--) hematochezia.  (--) fecal incontinence.  (--) fecal smearing/urgency.  (--) incomplete evacuation. Reports intermittent constipation since February.  Has a normal bowel movement once or twice a week.      4) Frequent UTI symptoms: Reports suprapubic pain and fullness, denies dysuria. Has stopped using poise pads for 2 weeks because she believed that is making UTI symptoms reoccur.      Changes from last visit:  Patient feels like she has a UTI presently. Is taking Azo.  She has been using vaginal estrogen 2 nights per week and daily Physician's choice probiotic with D-mannose and cranberry. Reports drinking plenty of water. Leakage has been the same, she got back on oxybutynin which she thinks helps and helps with UTI symptoms. Bowel movements are better.     Past Medical History:   Diagnosis Date    Allergy     Depression     High cholesterol     Hypertension     Overactive bladder     Type 2 diabetes mellitus without complications        Past Surgical History:   Procedure Laterality  Date     SECTION      HYSTERECTOMY      KNEE SURGERY      OOPHORECTOMY      SHOULDER SURGERY      SHOULDER SURGERY         Family History   Problem Relation Age of Onset    Diabetes Mother     Breast cancer Mother     Diabetes Father        Social History     Socioeconomic History    Marital status:    Tobacco Use    Smoking status: Never    Smokeless tobacco: Never   Substance and Sexual Activity    Alcohol use: Yes     Comment: socially    Drug use: No    Sexual activity: Yes     Partners: Male     Birth control/protection: See Surgical Hx       Current Outpatient Medications   Medication Sig Dispense Refill    alprazolam (XANAX) 1 MG tablet Take 1 mg by mouth.      citalopram (CELEXA) 20 MG tablet Take 20 mg by mouth once daily.       estradioL (ESTRACE) 0.01 % (0.1 mg/gram) vaginal cream Place 1 g vaginally twice a week. 42.5 g 11    famotidine (PEPCID) 20 MG tablet Take 20 mg by mouth 2 (two) times daily as needed.      fexofenadine (ALLEGRA) 180 MG tablet Take 180 mg by mouth once daily.      ibuprofen (ADVIL,MOTRIN) 800 MG tablet Take 800 mg by mouth 3 (three) times daily.      levothyroxine (SYNTHROID) 50 MCG tablet Take 50 mcg by mouth before breakfast.      losartan (COZAAR) 50 MG tablet Take 50 mg by mouth.      metFORMIN (GLUCOPHAGE-XR) 500 MG ER 24hr tablet Take 500 mg by mouth 2 (two) times daily.      methen-m.blue-s.phos-phsal-hyo (URIBEL) 118-10-40.8-36 mg Cap Take 1 capsule by mouth 4 (four) times daily as needed (dysuria). 20 capsule 1    ondansetron (ZOFRAN-ODT) 4 MG TbDL Take 4 mg by mouth every 8 (eight) hours as needed.      oxybutynin (DITROPAN-XL) 10 MG 24 hr tablet Take 10 mg by mouth.      oxyCODONE-acetaminophen (PERCOCET) 5-325 mg per tablet Take by mouth.      pantoprazole (PROTONIX) 40 MG tablet Take 40 mg by mouth.      phenazopyridine (PYRIDIUM) 200 MG tablet Take 1 tablet (200 mg total) by mouth 3 (three) times daily as needed for Pain. 10 tablet 0    pravastatin  (PRAVACHOL) 20 MG tablet Take 20 mg by mouth.      semaglutide (OZEMPIC) 1 mg/dose (2 mg/1.5 mL) PnIj        No current facility-administered medications for this visit.       Review of patient's allergies indicates:  No Known Allergies    OB History          2    Para   2    Term   2            AB        Living   2         SAB        IAB        Ectopic        Multiple        Live Births   2                  Well Woman  No LMP recorded. Patient has had a hysterectomy.   Last pap: 2022, normal   Last mammogram: February of this year, normal   Colonoscopy: 2022     ROS  As per HPI.      Physical Exam  /85   Pulse 76   Wt 87.8 kg (193 lb 9 oz)   BMI 29.43 kg/m²   General: alert and oriented, no acute distress  Respiratory: normal respiratory effort  Abd: soft, non-tender, non-distended  Pelvic:  Ext. Genitalia: normal external genitalia. Normal bartholin's and skeens glands  Vagina: -- atrophy. Normal vaginal mucosa without lesions. No discharge noted.   Non-tender bladder base without palpable mass.  Cervix: absent  Uterus:  surgically absent, vaginal cuff well healed   Urethra: no masses or tenderness  Urethral meatus: no lesions, caruncle or prolapse.    PVR 30 cc    Impression  1. Recurrent UTI  Urine culture    Urine culture      2. UTI symptoms  phenazopyridine (PYRIDIUM) 200 MG tablet    ciprofloxacin HCl (CIPRO) 500 MG tablet    Urine culture      3. Mixed stress and urge urinary incontinence  vibegron (GEMTESA) 75 mg Tab    Urine culture        We reviewed the above issues and discussed options for short-term versus long-term management of her problems.     Plan:   1)  Urinary urgency/frequency:    --Empty bladder every 2-3 hours even if you don't have the urge.  Empty well: wait a minute, lean forward on toilet.    --Try not to go more frequently than once an hour. When you get the urge to urinate after you have just gone, distract yourself until the urge passes.    --Avoid dietary irritants (see sheet).  Keep diary x 3-5 days to determine your irritants.  --KEGELS: do 10 in AM and 10 in PM, holding each x 10 seconds.  When you feel urge to go, STOP, KEGEL, and when urge has passed, then go to bathroom.    --Starting Pelvic Floor Physical Therapy (PT) in November.   --URGE LEAKAGE: Start Gemtesa if affordable. Coupon: Text GEMTESA to [id number]. Takes 2-4 weeks to see if will have effect.  For dry mouth: use Biotene mouthwash, get sour, sugar free lozenge or gum.  May increase constipation.   --STRESS LEAKAGE (leak with laugh/cough/sneeze):  Pelvic floor PT vs Pessary vs. Mid-urethral sling surgery vs Impressa vs Periurethral bulking.   -- PVR (post void residual): 30 cc  -- Hgb A1C:  None recent on file     2)  Frequent UTI symptoms:  --urine C&S sent today from cath specimen  --Start Cipro today for 7 days  --If you feel like you have a UTI, please call our office so that we can place an order for you to drop off a urine specimen at the closest [facility] lab (we will arrange).                --We will call in antibiotics for you to start right after you drop off specimen.                --In this way, we can determine:                           1)  Do you have a UTI?  Or is it inflammation/dryness?                          2) If you have a UTI, is it sensitive to the antibiotics we prescribed?   --follow UTI prevention tips (see attached)  --control bowel movements/fecal cross-contamination  --treat vaginal atrophy (dryness) -- see below  --empty bladder before and after intercourse  --continue taking 1 cranberry tablet daily ex Theracran  --continue taking 1 probiotic pill (any with lactobacillus and/or acidophilus) daily -- recommend Physician's Choice because has cranberry, D-mannose, and probiotic all in one  --continue daily D-mannose  --consider need for further evaluation (pelvic imaging and cystoscopy) if issue persists -- previous normal     3) Vaginal atrophy  (dryness):    A)  Vaginal estrogen:  --Continue 0.5 grams of estrogen cream in vagina with applicator or dime-sized amount with finger (as far as can reach internally) nightly x 2 weeks, then twice a week thereafter.  You can also apply a dime-sized amount with your finger around the vaginal opening and inner lips at same frequency.                           --Vaginal estrogen may help to decrease pain related to dryness with intercourse and urinary symptoms (urgency/frequency/UTIs) around menopause.               [email or web address].[email or web address]  B) Non-estrogen options for vaginal dryness:  --Use REPLENS or REFRESH OTC: 1/2 applicator full in vagina twice a week.    --coconut oil: dime-sized amount with finger (as far as can reach internally) and around the vaginal opening and inner lips up to nightly as needed  --Uberlube, Astroglide, [place] liquibeads, Satin by Sliquid Natural Intimate Moisturizer     4) PAD CARE  -- change pad EVERY TIME you go to the restroom, even if it is not wet.  --You can use small pads/panty liners inside of diaper  -- If you are at home, try not to wear pads unless necessary.   -- Use 100% cotton pads (ex. Kotex) instead of synthetic  -- Use barrier cream (ex. A&D, Thad's Butt Paste, Aquaphor) to prevent frictional rubbing from overuse of pads      Nocturia (nighttime urination):   --stop fluids 2-3 hours before bed.    --limit water at night: don't keep by bed--keep in bathroom or nearby or just keep ice chips at bedside  --for dry mouth: get sugar free lozenge or Biotene mouthwash to help stimulate salivation  --hydrate well during daytime  --figure out what time major nightime leakage happens, then set alarm for 30 min-1 hour before event and empty bladder so can prevent issue  --If have leg swelling:  Elevate feet above chest x 1 hour before bed to get excess fluid off.  Can also use support hose (knee highs).       Constipation:  --  "Controlling constipation may help bladder urgency/leakage and fiber may better control cholesterol and blood glucose.   -- Increase daily fiber.  Start taking 2 tsp of fiber powder (psyllium or other sugar-free powder, ex. Benefiber or Metamucil) or fiber gummies or fiber pills.  Mix in 8 oz of water.  Take x 3-5 days.  Then, increase fiber by 1 tsp every 3-5 days until stool is easy to pass.  Stop and continue at that dose.   **Do not exceed 6 tsps/day.   -- May also use over the counter stool softener (ex Colace) 1-2 x/day.  **AVOID laxatives.  -- Continue "Natural Vitality Calm" powder or magnesium oxide 400-500 mg per night (helps with sleep, anxiety, and constipation)  -- Drink plenty of water!  -- Get a SQUATTY POTTY     RTC in 3 months after starting pelvic floor PT and Gemtesa or other medication (can be virtual if no symptoms)     20 minutes were spent in face to face time with this patient  90 % of this time was spent in counseling and/or coordination of care    Emanuel Velasquez PA-C  Division of Female Pelvic Medicine and Reconstructive Surgery  Department of Obstetrics & Gynecology  [facility] [place], [place]      "

## 2023-09-27 ENCOUNTER — OFFICE VISIT (OUTPATIENT)
Dept: UROGYNECOLOGY | Facility: CLINIC | Age: 53
End: 2023-09-27
Payer: COMMERCIAL

## 2023-09-27 ENCOUNTER — TELEPHONE (OUTPATIENT)
Dept: UROGYNECOLOGY | Facility: CLINIC | Age: 53
End: 2023-09-27
Payer: COMMERCIAL

## 2023-09-27 VITALS
BODY MASS INDEX: 29.43 KG/M2 | DIASTOLIC BLOOD PRESSURE: 85 MMHG | SYSTOLIC BLOOD PRESSURE: 128 MMHG | WEIGHT: 193.56 LBS | HEART RATE: 76 BPM

## 2023-09-27 DIAGNOSIS — R39.9 UTI SYMPTOMS: Primary | ICD-10-CM

## 2023-09-27 DIAGNOSIS — N39.0 RECURRENT UTI: Primary | ICD-10-CM

## 2023-09-27 DIAGNOSIS — N39.46 MIXED STRESS AND URGE URINARY INCONTINENCE: ICD-10-CM

## 2023-09-27 DIAGNOSIS — R39.9 UTI SYMPTOMS: ICD-10-CM

## 2023-09-27 LAB
BILIRUB SERPL-MCNC: NORMAL MG/DL
BLOOD URINE, POC: NORMAL
CLARITY, POC UA: NORMAL
COLOR, POC UA: NORMAL
GLUCOSE UR QL STRIP: NORMAL
KETONES UR QL STRIP: NORMAL
LEUKOCYTE ESTERASE URINE, POC: NORMAL
NITRITE, POC UA: NORMAL
PH, POC UA: 5
PROTEIN, POC: NORMAL
SPECIFIC GRAVITY, POC UA: 1.02
UROBILINOGEN, POC UA: 1

## 2023-09-27 PROCEDURE — 99999 PR PBB SHADOW E&M-EST. PATIENT-LVL IV: ICD-10-PCS | Mod: PBBFAC,,, | Performed by: PHYSICIAN ASSISTANT

## 2023-09-27 PROCEDURE — 81002 URINALYSIS NONAUTO W/O SCOPE: CPT | Mod: S$GLB,,, | Performed by: PHYSICIAN ASSISTANT

## 2023-09-27 PROCEDURE — 1159F PR MEDICATION LIST DOCUMENTED IN MEDICAL RECORD: ICD-10-PCS | Mod: CPTII,S$GLB,, | Performed by: PHYSICIAN ASSISTANT

## 2023-09-27 PROCEDURE — 4010F ACE/ARB THERAPY RXD/TAKEN: CPT | Mod: CPTII,S$GLB,, | Performed by: PHYSICIAN ASSISTANT

## 2023-09-27 PROCEDURE — 1159F MED LIST DOCD IN RCRD: CPT | Mod: CPTII,S$GLB,, | Performed by: PHYSICIAN ASSISTANT

## 2023-09-27 PROCEDURE — 3079F PR MOST RECENT DIASTOLIC BLOOD PRESSURE 80-89 MM HG: ICD-10-PCS | Mod: CPTII,S$GLB,, | Performed by: PHYSICIAN ASSISTANT

## 2023-09-27 PROCEDURE — 99213 PR OFFICE/OUTPT VISIT, EST, LEVL III, 20-29 MIN: ICD-10-PCS | Mod: S$GLB,,, | Performed by: PHYSICIAN ASSISTANT

## 2023-09-27 PROCEDURE — 87186 SC STD MICRODIL/AGAR DIL: CPT | Performed by: PHYSICIAN ASSISTANT

## 2023-09-27 PROCEDURE — 3074F SYST BP LT 130 MM HG: CPT | Mod: CPTII,S$GLB,, | Performed by: PHYSICIAN ASSISTANT

## 2023-09-27 PROCEDURE — 81002 POCT URINE DIPSTICK WITHOUT MICROSCOPE: ICD-10-PCS | Mod: S$GLB,,, | Performed by: PHYSICIAN ASSISTANT

## 2023-09-27 PROCEDURE — 4010F PR ACE/ARB THEARPY RXD/TAKEN: ICD-10-PCS | Mod: CPTII,S$GLB,, | Performed by: PHYSICIAN ASSISTANT

## 2023-09-27 PROCEDURE — 99213 OFFICE O/P EST LOW 20 MIN: CPT | Mod: S$GLB,,, | Performed by: PHYSICIAN ASSISTANT

## 2023-09-27 PROCEDURE — 3074F PR MOST RECENT SYSTOLIC BLOOD PRESSURE < 130 MM HG: ICD-10-PCS | Mod: CPTII,S$GLB,, | Performed by: PHYSICIAN ASSISTANT

## 2023-09-27 PROCEDURE — 99999 PR PBB SHADOW E&M-EST. PATIENT-LVL IV: CPT | Mod: PBBFAC,,, | Performed by: PHYSICIAN ASSISTANT

## 2023-09-27 PROCEDURE — 87086 URINE CULTURE/COLONY COUNT: CPT | Performed by: PHYSICIAN ASSISTANT

## 2023-09-27 PROCEDURE — 3008F BODY MASS INDEX DOCD: CPT | Mod: CPTII,S$GLB,, | Performed by: PHYSICIAN ASSISTANT

## 2023-09-27 PROCEDURE — 87088 URINE BACTERIA CULTURE: CPT | Performed by: PHYSICIAN ASSISTANT

## 2023-09-27 PROCEDURE — 87077 CULTURE AEROBIC IDENTIFY: CPT | Performed by: PHYSICIAN ASSISTANT

## 2023-09-27 PROCEDURE — 3008F PR BODY MASS INDEX (BMI) DOCUMENTED: ICD-10-PCS | Mod: CPTII,S$GLB,, | Performed by: PHYSICIAN ASSISTANT

## 2023-09-27 PROCEDURE — 3079F DIAST BP 80-89 MM HG: CPT | Mod: CPTII,S$GLB,, | Performed by: PHYSICIAN ASSISTANT

## 2023-09-27 RX ORDER — VIBEGRON 75 MG/1
75 TABLET, FILM COATED ORAL DAILY
Qty: 90 TABLET | Refills: 3 | Status: SHIPPED | OUTPATIENT
Start: 2023-09-27

## 2023-09-27 RX ORDER — CIPROFLOXACIN 500 MG/1
500 TABLET ORAL EVERY 12 HOURS
Qty: 14 TABLET | Refills: 0 | Status: SHIPPED | OUTPATIENT
Start: 2023-09-27 | End: 2023-10-04

## 2023-09-27 RX ORDER — OXYCODONE AND ACETAMINOPHEN 5; 325 MG/1; MG/1
TABLET ORAL
COMMUNITY
Start: 2023-08-29

## 2023-09-27 RX ORDER — PHENAZOPYRIDINE HYDROCHLORIDE 200 MG/1
200 TABLET, FILM COATED ORAL 3 TIMES DAILY PRN
Qty: 10 TABLET | Refills: 0 | Status: SHIPPED | OUTPATIENT
Start: 2023-09-27 | End: 2024-02-16 | Stop reason: SDUPTHER

## 2023-09-27 NOTE — PATIENT INSTRUCTIONS
1)  Urinary urgency/frequency:    --Empty bladder every 2-3 hours even if you don't have the urge.  Empty well: wait a minute, lean forward on toilet.    --Try not to go more frequently than once an hour. When you get the urge to urinate after you have just gone, distract yourself until the urge passes.   --Avoid dietary irritants (see sheet).  Keep diary x 3-5 days to determine your irritants.  --KEGELS: do 10 in AM and 10 in PM, holding each x 10 seconds.  When you feel urge to go, STOP, KEGEL, and when urge has passed, then go to bathroom.    --Starting Pelvic Floor Physical Therapy (PT) in November.   --URGE LEAKAGE: Start Gemtesa if affordable. Text GEMTESA to [id number]. Takes 2-4 weeks to see if will have effect.  For dry mouth: use Biotene mouthwash, get sour, sugar free lozenge or gum.  May increase constipation.   --STRESS LEAKAGE (leak with laugh/cough/sneeze):  Pelvic floor PT vs Pessary vs. Mid-urethral sling surgery vs Impressa vs Periurethral bulking.   -- PVR (post void residual): 60 cc  -- Hgb A1C:  None recent on file     2)  Frequent UTI symptoms:  --urine C&S sent today from cath specimen  --Start Cipro today for 7 days  --If you feel like you have a UTI, please call our office so that we can place an order for you to drop off a urine specimen at the closest [facility] lab (we will arrange).                --We will call in antibiotics for you to start right after you drop off specimen.                --In this way, we can determine:                           1)  Do you have a UTI?  Or is it inflammation/dryness?                          2) If you have a UTI, is it sensitive to the antibiotics we prescribed?   --follow UTI prevention tips (see attached)  --control bowel movements/fecal cross-contamination  --treat vaginal atrophy (dryness) -- see below  --empty bladder before and after intercourse  --continue taking 1 cranberry tablet daily ex Theracran  --continue taking 1 probiotic pill (any with  lactobacillus and/or acidophilus) daily -- recommend Physician's Choice because has cranberry, D-mannose, and probiotic all in one  --continue daily D-mannose  --consider need for further evaluation (pelvic imaging and cystoscopy) if issue persists -- previous normal     3) Vaginal atrophy (dryness):    A)  Vaginal estrogen:  --Continue 0.5 grams of estrogen cream in vagina with applicator or dime-sized amount with finger (as far as can reach internally) nightly x 2 weeks, then twice a week thereafter.  You can also apply a dime-sized amount with your finger around the vaginal opening and inner lips at same frequency.                           --Vaginal estrogen may help to decrease pain related to dryness with intercourse and urinary symptoms (urgency/frequency/UTIs) around menopause.               [email or web address]-[email or web address]_Estrogen_Therapy.pdf  B) Non-estrogen options for vaginal dryness:  --Use REPLENS or REFRESH OTC: 1/2 applicator full in vagina twice a week.    --coconut oil: dime-sized amount with finger (as far as can reach internally) and around the vaginal opening and inner lips up to nightly as needed  --Uberlube, Astroglide, [place] liquibeads, Satin by Sliquid Natural Intimate Moisturizer     4) PAD CARE  -- change pad EVERY TIME you go to the restroom, even if it is not wet.  --You can use small pads/panty liners inside of diaper  -- If you are at home, try not to wear pads unless necessary.   -- Use 100% cotton pads (ex. Kotex) instead of synthetic  -- Use barrier cream (ex. A&D, Thad's Butt Paste, Aquaphor) to prevent frictional rubbing from overuse of pads      Nocturia (nighttime urination):   --stop fluids 2-3 hours before bed.    --limit water at night: don't keep by bed--keep in bathroom or nearby or just keep ice chips at bedside  --for dry mouth: get sugar free lozenge or Biotene mouthwash to help stimulate salivation  --hydrate well during daytime  --figure out  "what time major nightime leakage happens, then set alarm for 30 min-1 hour before event and empty bladder so can prevent issue  --If have leg swelling:  Elevate feet above chest x 1 hour before bed to get excess fluid off.  Can also use support hose (knee highs).       Constipation:  -- Controlling constipation may help bladder urgency/leakage and fiber may better control cholesterol and blood glucose.   -- Increase daily fiber.  Start taking 2 tsp of fiber powder (psyllium or other sugar-free powder, ex. Benefiber or Metamucil) or fiber gummies or fiber pills.  Mix in 8 oz of water.  Take x 3-5 days.  Then, increase fiber by 1 tsp every 3-5 days until stool is easy to pass.  Stop and continue at that dose.   **Do not exceed 6 tsps/day.   -- May also use over the counter stool softener (ex Colace) 1-2 x/day.  **AVOID laxatives.  -- Continue "Natural Vitality Calm" powder or magnesium oxide 400-500 mg per night (helps with sleep, anxiety, and constipation)  -- Drink plenty of water!  -- Get a SQUATTY POTTY     RTC in 3 months after starting pelvic floor PT and Gemtesa or other medication  "

## 2023-09-30 LAB — BACTERIA UR CULT: ABNORMAL

## 2023-10-03 DIAGNOSIS — R39.9 UTI SYMPTOMS: Primary | ICD-10-CM

## 2023-10-03 DIAGNOSIS — N39.0 RECURRENT UTI: ICD-10-CM

## 2023-10-03 RX ORDER — AMOXICILLIN AND CLAVULANATE POTASSIUM 875; 125 MG/1; MG/1
1 TABLET, FILM COATED ORAL EVERY 12 HOURS
Qty: 10 TABLET | Refills: 0 | Status: SHIPPED | OUTPATIENT
Start: 2023-10-03 | End: 2023-10-08

## 2023-11-17 ENCOUNTER — LAB VISIT (OUTPATIENT)
Dept: LAB | Facility: HOSPITAL | Age: 53
End: 2023-11-17
Attending: PHYSICIAN ASSISTANT
Payer: COMMERCIAL

## 2023-11-17 ENCOUNTER — PATIENT MESSAGE (OUTPATIENT)
Dept: UROGYNECOLOGY | Facility: CLINIC | Age: 53
End: 2023-11-17
Payer: COMMERCIAL

## 2023-11-17 DIAGNOSIS — N39.0 RECURRENT UTI: Primary | ICD-10-CM

## 2023-11-17 DIAGNOSIS — N39.0 RECURRENT UTI: ICD-10-CM

## 2023-11-17 PROCEDURE — 87077 CULTURE AEROBIC IDENTIFY: CPT | Mod: PN | Performed by: PHYSICIAN ASSISTANT

## 2023-11-17 PROCEDURE — 87186 SC STD MICRODIL/AGAR DIL: CPT | Mod: PN | Performed by: PHYSICIAN ASSISTANT

## 2023-11-17 PROCEDURE — 87086 URINE CULTURE/COLONY COUNT: CPT | Mod: PN | Performed by: PHYSICIAN ASSISTANT

## 2023-11-17 PROCEDURE — 87088 URINE BACTERIA CULTURE: CPT | Mod: PN | Performed by: PHYSICIAN ASSISTANT

## 2023-11-20 LAB — BACTERIA UR CULT: ABNORMAL

## 2023-11-20 RX ORDER — SULFAMETHOXAZOLE AND TRIMETHOPRIM 800; 160 MG/1; MG/1
1 TABLET ORAL 2 TIMES DAILY
Qty: 14 TABLET | Refills: 0 | Status: SHIPPED | OUTPATIENT
Start: 2023-11-20 | End: 2023-11-27

## 2023-12-19 ENCOUNTER — LAB VISIT (OUTPATIENT)
Dept: LAB | Facility: HOSPITAL | Age: 53
End: 2023-12-19
Attending: PHYSICIAN ASSISTANT
Payer: COMMERCIAL

## 2023-12-19 DIAGNOSIS — N39.0 RECURRENT UTI: ICD-10-CM

## 2023-12-19 PROCEDURE — 87086 URINE CULTURE/COLONY COUNT: CPT | Mod: PN | Performed by: PHYSICIAN ASSISTANT

## 2023-12-19 NOTE — PROGRESS NOTES
The patient location is: [place], [place]  The chief complaint leading to consultation is: urinary burning    Visit type: audiovisual    Face to Face time with patient: 7 mins  10 minutes of total time spent on the encounter, which includes face to face time and non-face to face time preparing to see the patient (eg, review of tests), Obtaining and/or reviewing separately obtained history, Documenting clinical information in the electronic or other health record, Independently interpreting results (not separately reported) and communicating results to the patient/family/caregiver, or Care coordination (not separately reported).         Each patient to whom he or she provides medical services by telemedicine is:  (1) informed of the relationship between the physician and patient and the respective role of any other health care provider with respect to management of the patient; and (2) notified that he or she may decline to receive medical services by telemedicine and may withdraw from such care at any time.    Notes:     [facility] - UROGYNECOLOGY  [address] [place] [id number]  2023     Nahomy Pate  [id number]  1970    UROGYN FOLLOW-UP  2023     53 y.o. female,   presents for urogyn follow up.   .     HPI from 2023:  HPI: 54 y/o  presents for recurrent UTI. No positive urine cultures on file.      1)  UI:  (+) LELA = (+) UUI  X 6months.  (--) pads. Daytime frequency: Q 1 hours.  Nocturia: Yes: 1/night.   (--) dysuria,  (--) hematuria,  (+) frequent UTIs.  (+) incomplete bladder emptying.      2)  POP:  Absent.  (+) sexually active.  (--) dyspareunia.  (+)  Vaginal dryness.  (--) vaginal estrogen use. Has used biotee pellets in the past. Stopped using due to excessive hair growth. Has never tried vaginal estrogen.      3)  BM:  (+) constipation/straining, but improved with magnesium, and fiber.  (--) chronic diarrhea. (--) hematochezia.  (--) fecal incontinence.  (--)  fecal smearing/urgency.  (--) incomplete evacuation. Reports intermittent constipation since February.  Has a normal bowel movement once or twice a week.      4) Frequent UTI symptoms: Reports suprapubic pain and fullness, denies dysuria. Has stopped using poise pads for 2 weeks because she believed that is making UTI symptoms reoccur.       2023:  Patient feels like she has a UTI presently. Is taking Azo.  She has been using vaginal estrogen 2 nights per week and daily Physician's choice probiotic with D-mannose and cranberry. Reports drinking plenty of water. Leakage has been the same, she got back on oxybutynin which she thinks helps and helps with UTI symptoms. Bowel movements are better.     Changes from last visit:  Patient started having some burning of the urethra and more frequent sensation to urinate a few days ago. She has a culture pending. Uribel once daily helps. Her BS have been a little elevated because she is taking steroid now for shoulder pain. Denies constipation.     Past Medical History:   Diagnosis Date    Allergy     Depression     High cholesterol     Hypertension     Overactive bladder     Type 2 diabetes mellitus without complications        Past Surgical History:   Procedure Laterality Date     SECTION      HYSTERECTOMY      KNEE SURGERY      OOPHORECTOMY      SHOULDER SURGERY      SHOULDER SURGERY         Family History   Problem Relation Age of Onset    Diabetes Mother     Breast cancer Mother     Diabetes Father        Social History     Socioeconomic History    Marital status:    Tobacco Use    Smoking status: Never    Smokeless tobacco: Never   Substance and Sexual Activity    Alcohol use: Yes     Comment: socially    Drug use: No    Sexual activity: Yes     Partners: Male     Birth control/protection: See Surgical Hx       Current Outpatient Medications   Medication Sig Dispense Refill    alprazolam (XANAX) 1 MG tablet Take 1 mg by mouth.      citalopram (CELEXA)  20 MG tablet Take 20 mg by mouth once daily.       estradioL (ESTRACE) 0.01 % (0.1 mg/gram) vaginal cream Place 1 g vaginally twice a week. 42.5 g 11    famotidine (PEPCID) 20 MG tablet Take 20 mg by mouth 2 (two) times daily as needed.      fexofenadine (ALLEGRA) 180 MG tablet Take 180 mg by mouth once daily.      ibuprofen (ADVIL,MOTRIN) 800 MG tablet Take 800 mg by mouth 3 (three) times daily.      levothyroxine (SYNTHROID) 50 MCG tablet Take 50 mcg by mouth before breakfast.      losartan (COZAAR) 50 MG tablet Take 50 mg by mouth.      metFORMIN (GLUCOPHAGE-XR) 500 MG ER 24hr tablet Take 500 mg by mouth 2 (two) times daily.      methen-m.blue-s.phos-phsal-hyo (URIBEL) 118-10-40.8-36 mg Cap Take 1 capsule by mouth 4 (four) times daily as needed (dysuria). 20 capsule 1    ondansetron (ZOFRAN-ODT) 4 MG TbDL Take 4 mg by mouth every 8 (eight) hours as needed.      oxybutynin (DITROPAN-XL) 10 MG 24 hr tablet Take 10 mg by mouth.      oxyCODONE-acetaminophen (PERCOCET) 5-325 mg per tablet Take by mouth.      pantoprazole (PROTONIX) 40 MG tablet Take 40 mg by mouth.      phenazopyridine (PYRIDIUM) 200 MG tablet Take 1 tablet (200 mg total) by mouth 3 (three) times daily as needed for Pain. 10 tablet 0    pravastatin (PRAVACHOL) 20 MG tablet Take 20 mg by mouth.      semaglutide (OZEMPIC) 1 mg/dose (2 mg/1.5 mL) PnIj       vibegron (GEMTESA) 75 mg Tab Take 75 mg by mouth once daily. 90 tablet 3     No current facility-administered medications for this visit.       Review of patient's allergies indicates:  No Known Allergies    OB History          2    Para   2    Term   2            AB        Living   2         SAB        IAB        Ectopic        Multiple        Live Births   2                  ROS  As per HPI.      Physical Exam  There were no vitals taken for this visit.  General: alert and oriented, no acute distress  Respiratory: normal respiratory effort    Impression  1. UTI symptoms          We  reviewed the above issues and discussed options for short-term versus long-term management of her problems.     Plan:   1)  Urinary urgency/frequency:    --Empty bladder every 2-3 hours even if you don't have the urge.  Empty well: wait a minute, lean forward on toilet.    --Try not to go more frequently than once an hour. When you get the urge to urinate after you have just gone, distract yourself until the urge passes.   --Avoid dietary irritants (see sheet).  Keep diary x 3-5 days to determine your irritants.  --KEGELS: do 10 in AM and 10 in PM, holding each x 10 seconds.  When you feel urge to go, STOP, KEGEL, and when urge has passed, then go to bathroom.    --Starting Pelvic Floor Physical Therapy (PT) in November.   --URGE LEAKAGE: Continue Gemtesa Takes 2-4 weeks to see if will have effect.  For dry mouth: use Biotene mouthwash, get sour, sugar free lozenge or gum.  May increase constipation.   --STRESS LEAKAGE (leak with laugh/cough/sneeze):  Pelvic floor PT vs Pessary vs. Mid-urethral sling surgery vs Impressa vs Periurethral bulking.   -- PVR (post void residual): 30 cc  -- Hgb A1C:  None recent on file     2)  Frequent UTI symptoms:  --culture pending from clean catch yesterday  --continue Uribel up to 4 times daily as needed  --If you feel like you have a UTI, please call our office so that we can place an order for you to drop off a urine specimen at the closest [facility] lab (we will arrange).                --We will call in antibiotics for you to start right after you drop off specimen.                --In this way, we can determine:                           1)  Do you have a UTI?  Or is it inflammation/dryness?                          2) If you have a UTI, is it sensitive to the antibiotics we prescribed?   --follow UTI prevention tips (see attached)  --control bowel movements/fecal cross-contamination  --treat vaginal atrophy (dryness) -- see below  --empty bladder before and after  intercourse  --continue taking 1 cranberry tablet daily ex Theracran  --continue taking 1 probiotic pill (any with lactobacillus and/or acidophilus) daily -- recommend Physician's Choice because has cranberry, D-mannose, and probiotic all in one  --continue daily D-mannose  --consider need for further evaluation (pelvic imaging and cystoscopy) if issue persists -- previous normal     3) Vaginal atrophy (dryness):    A)  Vaginal estrogen:  --Continue 0.5 grams of estrogen cream in vagina with applicator or dime-sized amount with finger (as far as can reach internally) nightly x 2 weeks, then twice a week thereafter.  You can also apply a dime-sized amount with your finger around the vaginal opening and inner lips at same frequency.                           --Vaginal estrogen may help to decrease pain related to dryness with intercourse and urinary symptoms (urgency/frequency/UTIs) around menopause.               https://[email or web address]/media/Low-Dose_Vaginal_Estrogen_Therapy.pdf  B) Non-estrogen options for vaginal dryness:  --Use REPLENS or REFRESH OTC: 1/2 applicator full in vagina twice a week.    --coconut oil: dime-sized amount with finger (as far as can reach internally) and around the vaginal opening and inner lips up to nightly as needed  --Uberlube, Astroglide, [place] liquibeads, Satin by Sliquid Natural Intimate Moisturizer    Constipation:  -- improved  -- Controlling constipation may help bladder urgency/leakage and fiber may better control cholesterol and blood glucose.   -- Increase daily fiber.  Start taking 2 tsp of fiber powder (psyllium or other sugar-free powder, ex. Benefiber or Metamucil) or fiber gummies or fiber pills.  Mix in 8 oz of water.  Take x 3-5 days.  Then, increase fiber by 1 tsp every 3-5 days until stool is easy to pass.  Stop and continue at that dose.   **Do not exceed 6 tsps/day.   -- May also use over the counter stool softener (ex Colace) 1-2 x/day.  **AVOID laxatives.  --  "Continue "Natural Vitality Calm" powder or magnesium oxide 400-500 mg per night (helps with sleep, anxiety, and constipation)  -- Drink plenty of water!  -- Get a SQUATTY POTTY     RTC in 3 months after starting pelvic floor PT (can be virtual if no symptoms)    7 minutes were spent in face to face time with this patient  100 % of this time was spent in counseling and/or coordination of care    Emanuel Velasquez PA-C  Division of Female Pelvic Medicine and Reconstructive Surgery  Department of Obstetrics & Gynecology  [facility] [place], [place]        "

## 2023-12-20 ENCOUNTER — OFFICE VISIT (OUTPATIENT)
Dept: UROGYNECOLOGY | Facility: CLINIC | Age: 53
End: 2023-12-20
Payer: COMMERCIAL

## 2023-12-20 DIAGNOSIS — R39.9 UTI SYMPTOMS: Primary | ICD-10-CM

## 2023-12-20 LAB — BACTERIA UR CULT: NORMAL

## 2023-12-20 PROCEDURE — 4010F ACE/ARB THERAPY RXD/TAKEN: CPT | Mod: CPTII,95,, | Performed by: PHYSICIAN ASSISTANT

## 2023-12-20 PROCEDURE — 99212 OFFICE O/P EST SF 10 MIN: CPT | Mod: 95,,, | Performed by: PHYSICIAN ASSISTANT

## 2023-12-20 PROCEDURE — 99212 PR OFFICE/OUTPT VISIT, EST, LEVL II, 10-19 MIN: ICD-10-PCS | Mod: 95,,, | Performed by: PHYSICIAN ASSISTANT

## 2023-12-20 PROCEDURE — 4010F PR ACE/ARB THEARPY RXD/TAKEN: ICD-10-PCS | Mod: CPTII,95,, | Performed by: PHYSICIAN ASSISTANT

## 2023-12-20 NOTE — PATIENT INSTRUCTIONS
1)  Urinary urgency/frequency:    --Empty bladder every 2-3 hours even if you don't have the urge.  Empty well: wait a minute, lean forward on toilet.    --Try not to go more frequently than once an hour. When you get the urge to urinate after you have just gone, distract yourself until the urge passes.   --Avoid dietary irritants (see sheet).  Keep diary x 3-5 days to determine your irritants.  --KEGELS: do 10 in AM and 10 in PM, holding each x 10 seconds.  When you feel urge to go, STOP, KEGEL, and when urge has passed, then go to bathroom.    --Starting Pelvic Floor Physical Therapy (PT) in November.   --URGE LEAKAGE: Continue Gemtesa Takes 2-4 weeks to see if will have effect.  For dry mouth: use Biotene mouthwash, get sour, sugar free lozenge or gum.  May increase constipation.   --STRESS LEAKAGE (leak with laugh/cough/sneeze):  Pelvic floor PT vs Pessary vs. Mid-urethral sling surgery vs Impressa vs Periurethral bulking.   -- PVR (post void residual): 30 cc  -- Hgb A1C:  None recent on file     2)  Frequent UTI symptoms:  --culture pending from clean catch yesterday  --continue Uribel up to 4 times daily as needed  --If you feel like you have a UTI, please call our office so that we can place an order for you to drop off a urine specimen at the closest [facility] lab (we will arrange).                --We will call in antibiotics for you to start right after you drop off specimen.                --In this way, we can determine:                           1)  Do you have a UTI?  Or is it inflammation/dryness?                          2) If you have a UTI, is it sensitive to the antibiotics we prescribed?   --follow UTI prevention tips (see attached)  --control bowel movements/fecal cross-contamination  --treat vaginal atrophy (dryness) -- see below  --empty bladder before and after intercourse  --continue taking 1 cranberry tablet daily ex Theracran  --continue taking 1 probiotic pill (any with lactobacillus and/or  "acidophilus) daily -- recommend Physician's Choice because has cranberry, D-mannose, and probiotic all in one  --continue daily D-mannose  --consider need for further evaluation (pelvic imaging and cystoscopy) if issue persists -- previous normal     3) Vaginal atrophy (dryness):    A)  Vaginal estrogen:  --Continue 0.5 grams of estrogen cream in vagina with applicator or dime-sized amount with finger (as far as can reach internally) nightly x 2 weeks, then twice a week thereafter.  You can also apply a dime-sized amount with your finger around the vaginal opening and inner lips at same frequency.                           --Vaginal estrogen may help to decrease pain related to dryness with intercourse and urinary symptoms (urgency/frequency/UTIs) around menopause.               https://www.[email or web address]/media/Low-Dose_Vaginal_Estrogen_Therapy.pdf  B) Non-estrogen options for vaginal dryness:  --Use REPLENS or REFRESH OTC: 1/2 applicator full in vagina twice a week.    --coconut oil: dime-sized amount with finger (as far as can reach internally) and around the vaginal opening and inner lips up to nightly as needed  --Uberlube, Astroglide, [place] liquibeads, Satin by Sliquid Natural Intimate Moisturizer    Constipation:  -- improved  -- Controlling constipation may help bladder urgency/leakage and fiber may better control cholesterol and blood glucose.   -- Increase daily fiber.  Start taking 2 tsp of fiber powder (psyllium or other sugar-free powder, ex. Benefiber or Metamucil) or fiber gummies or fiber pills.  Mix in 8 oz of water.  Take x 3-5 days.  Then, increase fiber by 1 tsp every 3-5 days until stool is easy to pass.  Stop and continue at that dose.   **Do not exceed 6 tsps/day.   -- May also use over the counter stool softener (ex Colace) 1-2 x/day.  **AVOID laxatives.  -- Continue "Natural Vitality Calm" powder or magnesium oxide 400-500 mg per night (helps with sleep, anxiety, and constipation)  -- Drink " plenty of water!  -- Get a SQUATTY POTTY     RTC in 3 months after starting pelvic floor PT (can be virtual if no symptoms)

## 2023-12-21 ENCOUNTER — PATIENT MESSAGE (OUTPATIENT)
Dept: UROGYNECOLOGY | Facility: CLINIC | Age: 53
End: 2023-12-21
Payer: COMMERCIAL

## 2024-01-04 ENCOUNTER — LAB VISIT (OUTPATIENT)
Dept: LAB | Facility: HOSPITAL | Age: 54
End: 2024-01-04
Attending: PHYSICIAN ASSISTANT
Payer: COMMERCIAL

## 2024-01-04 ENCOUNTER — PATIENT MESSAGE (OUTPATIENT)
Dept: UROGYNECOLOGY | Facility: CLINIC | Age: 54
End: 2024-01-04
Payer: COMMERCIAL

## 2024-01-04 DIAGNOSIS — R39.9 UTI SYMPTOMS: Primary | ICD-10-CM

## 2024-01-04 DIAGNOSIS — N39.0 RECURRENT UTI: ICD-10-CM

## 2024-01-04 PROCEDURE — 87086 URINE CULTURE/COLONY COUNT: CPT | Mod: PN | Performed by: PHYSICIAN ASSISTANT

## 2024-01-04 PROCEDURE — 87077 CULTURE AEROBIC IDENTIFY: CPT | Mod: 59,PN | Performed by: PHYSICIAN ASSISTANT

## 2024-01-04 PROCEDURE — 87088 URINE BACTERIA CULTURE: CPT | Mod: PN | Performed by: PHYSICIAN ASSISTANT

## 2024-01-04 PROCEDURE — 87186 SC STD MICRODIL/AGAR DIL: CPT | Mod: 59,PN | Performed by: PHYSICIAN ASSISTANT

## 2024-01-05 RX ORDER — SULFAMETHOXAZOLE AND TRIMETHOPRIM 800; 160 MG/1; MG/1
1 TABLET ORAL 2 TIMES DAILY
Qty: 10 TABLET | Refills: 0 | Status: SHIPPED | OUTPATIENT
Start: 2024-01-05 | End: 2024-01-08 | Stop reason: ALTCHOICE

## 2024-01-07 LAB — BACTERIA UR CULT: ABNORMAL

## 2024-01-08 ENCOUNTER — PATIENT MESSAGE (OUTPATIENT)
Dept: UROGYNECOLOGY | Facility: CLINIC | Age: 54
End: 2024-01-08
Payer: COMMERCIAL

## 2024-01-08 DIAGNOSIS — N39.0 RECURRENT UTI: Primary | ICD-10-CM

## 2024-01-08 RX ORDER — CIPROFLOXACIN 500 MG/1
500 TABLET ORAL EVERY 12 HOURS
Qty: 10 TABLET | Refills: 0 | Status: SHIPPED | OUTPATIENT
Start: 2024-01-08 | End: 2024-01-13

## 2024-01-22 ENCOUNTER — PATIENT MESSAGE (OUTPATIENT)
Dept: UROGYNECOLOGY | Facility: CLINIC | Age: 54
End: 2024-01-22
Payer: COMMERCIAL

## 2024-01-22 DIAGNOSIS — R39.9 UTI SYMPTOMS: ICD-10-CM

## 2024-01-22 RX ORDER — METHENAMINE, SODIUM PHOSPHATE, MONOBASIC, MONOHYDRATE, PHENYL SALICYLATE, METHYLENE BLUE, AND HYOSCYAMINE SULFATE 118; 40.8; 36; 10; .12 MG/1; MG/1; MG/1; MG/1; MG/1
1 CAPSULE ORAL 4 TIMES DAILY PRN
Qty: 20 CAPSULE | Refills: 11 | Status: SHIPPED | OUTPATIENT
Start: 2024-01-22

## 2024-02-16 ENCOUNTER — TELEPHONE (OUTPATIENT)
Dept: UROGYNECOLOGY | Facility: CLINIC | Age: 54
End: 2024-02-16
Payer: COMMERCIAL

## 2024-02-16 ENCOUNTER — LAB VISIT (OUTPATIENT)
Dept: LAB | Facility: HOSPITAL | Age: 54
End: 2024-02-16
Payer: COMMERCIAL

## 2024-02-16 ENCOUNTER — PATIENT MESSAGE (OUTPATIENT)
Dept: UROGYNECOLOGY | Facility: CLINIC | Age: 54
End: 2024-02-16
Payer: COMMERCIAL

## 2024-02-16 DIAGNOSIS — R30.0 DYSURIA: Primary | ICD-10-CM

## 2024-02-16 DIAGNOSIS — R30.0 DYSURIA: ICD-10-CM

## 2024-02-16 DIAGNOSIS — R39.9 UTI SYMPTOMS: Primary | ICD-10-CM

## 2024-02-16 DIAGNOSIS — N39.0 RECURRENT UTI: ICD-10-CM

## 2024-02-16 LAB
BACTERIA #/AREA URNS AUTO: ABNORMAL /HPF
BILIRUB UR QL STRIP: NEGATIVE
CLARITY UR REFRACT.AUTO: ABNORMAL
COLOR UR AUTO: ABNORMAL
GLUCOSE UR QL STRIP: NEGATIVE
HGB UR QL STRIP: NEGATIVE
KETONES UR QL STRIP: NEGATIVE
LEUKOCYTE ESTERASE UR QL STRIP: ABNORMAL
MICROSCOPIC COMMENT: ABNORMAL
NITRITE UR QL STRIP: NEGATIVE
PH UR STRIP: 5 [PH] (ref 5–8)
PROT UR QL STRIP: NEGATIVE
RBC #/AREA URNS AUTO: 3 /HPF (ref 0–4)
SP GR UR STRIP: 1.01 (ref 1–1.03)
SQUAMOUS #/AREA URNS AUTO: 1 /HPF
URN SPEC COLLECT METH UR: ABNORMAL
WBC #/AREA URNS AUTO: 21 /HPF (ref 0–5)
WBC CLUMPS UR QL AUTO: ABNORMAL

## 2024-02-16 PROCEDURE — 87186 SC STD MICRODIL/AGAR DIL: CPT | Performed by: PHYSICIAN ASSISTANT

## 2024-02-16 PROCEDURE — 87077 CULTURE AEROBIC IDENTIFY: CPT | Performed by: PHYSICIAN ASSISTANT

## 2024-02-16 PROCEDURE — 81001 URINALYSIS AUTO W/SCOPE: CPT | Performed by: PHYSICIAN ASSISTANT

## 2024-02-16 PROCEDURE — 87086 URINE CULTURE/COLONY COUNT: CPT | Performed by: PHYSICIAN ASSISTANT

## 2024-02-16 PROCEDURE — 87088 URINE BACTERIA CULTURE: CPT | Performed by: PHYSICIAN ASSISTANT

## 2024-02-16 RX ORDER — CIPROFLOXACIN 500 MG/1
500 TABLET ORAL EVERY 12 HOURS
Qty: 14 TABLET | Refills: 0 | Status: SHIPPED | OUTPATIENT
Start: 2024-02-16 | End: 2024-02-23

## 2024-02-16 RX ORDER — PHENAZOPYRIDINE HYDROCHLORIDE 200 MG/1
200 TABLET, FILM COATED ORAL 3 TIMES DAILY PRN
Qty: 10 TABLET | Refills: 0 | Status: SHIPPED | OUTPATIENT
Start: 2024-02-16

## 2024-02-16 RX ORDER — NITROFURANTOIN 25; 75 MG/1; MG/1
100 CAPSULE ORAL 2 TIMES DAILY PRN
Qty: 30 CAPSULE | Refills: 11 | Status: SHIPPED | OUTPATIENT
Start: 2024-02-16

## 2024-02-18 LAB — BACTERIA UR CULT: ABNORMAL

## 2024-05-09 ENCOUNTER — HOSPITAL ENCOUNTER (EMERGENCY)
Facility: HOSPITAL | Age: 54
Discharge: HOME | End: 2024-05-09
Attending: EMERGENCY MEDICINE
Payer: COMMERCIAL

## 2024-05-09 VITALS
HEIGHT: 67 IN | HEART RATE: 84 BPM | DIASTOLIC BLOOD PRESSURE: 79 MMHG | SYSTOLIC BLOOD PRESSURE: 127 MMHG | TEMPERATURE: 98 F | WEIGHT: 185 LBS | RESPIRATION RATE: 15 BRPM | OXYGEN SATURATION: 99 % | BODY MASS INDEX: 29.03 KG/M2

## 2024-05-09 DIAGNOSIS — R42 VERTIGO: ICD-10-CM

## 2024-05-09 LAB
ALBUMIN SERPL BCP-MCNC: 4.4 G/DL (ref 3.5–5.2)
ALP SERPL-CCNC: 68 U/L (ref 38–126)
ALT SERPL W/O P-5'-P-CCNC: 14 U/L (ref 10–44)
ANION GAP SERPL CALC-SCNC: 10 MMOL/L (ref 8–16)
AST SERPL-CCNC: 20 U/L (ref 15–46)
BASOPHILS # BLD AUTO: 0.01 K/UL (ref 0–0.2)
BASOPHILS NFR BLD: 0.2 % (ref 0–1.9)
BILIRUB SERPL-MCNC: 0.5 MG/DL (ref 0.1–1)
CALCIUM SERPL-MCNC: 9.3 MG/DL (ref 8.7–10.5)
CHLORIDE SERPL-SCNC: 106 MMOL/L (ref 95–110)
CO2 SERPL-SCNC: 25 MMOL/L (ref 23–29)
CREAT SERPL-MCNC: 0.59 MG/DL (ref 0.5–1.4)
DIFFERENTIAL METHOD BLD: ABNORMAL
EOSINOPHIL # BLD AUTO: 0.1 K/UL (ref 0–0.5)
EOSINOPHIL NFR BLD: 1.5 % (ref 0–8)
ERYTHROCYTE [DISTWIDTH] IN BLOOD BY AUTOMATED COUNT: 12.8 % (ref 11.5–14.5)
EST. GFR  (NO RACE VARIABLE): >60 ML/MIN/1.73 M^2
GLUCOSE SERPL-MCNC: 128 MG/DL (ref 70–110)
HCT VFR BLD AUTO: 42.4 % (ref 37–48.5)
HGB BLD-MCNC: 14.1 G/DL (ref 12–16)
IMM GRANULOCYTES # BLD AUTO: 0.01 K/UL (ref 0–0.04)
IMM GRANULOCYTES NFR BLD AUTO: 0.2 % (ref 0–0.5)
LYMPHOCYTES # BLD AUTO: 1.1 K/UL (ref 1–4.8)
LYMPHOCYTES NFR BLD: 26.8 % (ref 18–48)
MAGNESIUM SERPL-MCNC: 1.9 MG/DL (ref 1.6–2.6)
MCH RBC QN AUTO: 29.1 PG (ref 27–31)
MCHC RBC AUTO-ENTMCNC: 33.3 G/DL (ref 32–36)
MCV RBC AUTO: 87 FL (ref 82–98)
MONOCYTES # BLD AUTO: 0.2 K/UL (ref 0.3–1)
MONOCYTES NFR BLD: 5.9 % (ref 4–15)
NEUTROPHILS # BLD AUTO: 2.7 K/UL (ref 1.8–7.7)
NEUTROPHILS NFR BLD: 65.4 % (ref 38–73)
NRBC BLD-RTO: 0 /100 WBC
PLATELET # BLD AUTO: 238 K/UL (ref 150–450)
PMV BLD AUTO: 9.8 FL (ref 9.2–12.9)
POTASSIUM SERPL-SCNC: 3.9 MMOL/L (ref 3.5–5.1)
PROT SERPL-MCNC: 7.2 G/DL (ref 6–8.4)
RBC # BLD AUTO: 4.85 M/UL (ref 4–5.4)
SODIUM SERPL-SCNC: 141 MMOL/L (ref 136–145)
TROPONIN I SERPL-MCNC: <0.012 NG/ML (ref 0.01–0.03)
UUN UR-MCNC: 14 MG/DL (ref 7–17)
WBC # BLD AUTO: 4.06 K/UL (ref 3.9–12.7)

## 2024-05-09 PROCEDURE — 99285 EMERGENCY DEPT VISIT HI MDM: CPT | Mod: 25,ER

## 2024-05-09 PROCEDURE — 96361 HYDRATE IV INFUSION ADD-ON: CPT | Mod: ER

## 2024-05-09 PROCEDURE — 84484 ASSAY OF TROPONIN QUANT: CPT | Mod: ER | Performed by: EMERGENCY MEDICINE

## 2024-05-09 PROCEDURE — 83735 ASSAY OF MAGNESIUM: CPT | Mod: ER | Performed by: EMERGENCY MEDICINE

## 2024-05-09 PROCEDURE — 85025 COMPLETE CBC W/AUTO DIFF WBC: CPT | Mod: ER | Performed by: EMERGENCY MEDICINE

## 2024-05-09 PROCEDURE — 96374 THER/PROPH/DIAG INJ IV PUSH: CPT | Mod: ER

## 2024-05-09 PROCEDURE — 80053 COMPREHEN METABOLIC PANEL: CPT | Mod: ER | Performed by: EMERGENCY MEDICINE

## 2024-05-09 PROCEDURE — 96375 TX/PRO/DX INJ NEW DRUG ADDON: CPT | Mod: ER

## 2024-05-09 PROCEDURE — 93005 ELECTROCARDIOGRAM TRACING: CPT | Mod: ER

## 2024-05-09 PROCEDURE — 25000003 PHARM REV CODE 250: Mod: ER | Performed by: EMERGENCY MEDICINE

## 2024-05-09 PROCEDURE — 93010 ELECTROCARDIOGRAM REPORT: CPT | Mod: ,,, | Performed by: INTERNAL MEDICINE

## 2024-05-09 PROCEDURE — 63600175 PHARM REV CODE 636 W HCPCS: Mod: ER | Performed by: EMERGENCY MEDICINE

## 2024-05-09 PROCEDURE — 99900035 HC TECH TIME PER 15 MIN (STAT): Mod: ER

## 2024-05-09 RX ORDER — PROCHLORPERAZINE EDISYLATE 5 MG/ML
10 INJECTION INTRAMUSCULAR; INTRAVENOUS
Status: COMPLETED | OUTPATIENT
Start: 2024-05-09 | End: 2024-05-09

## 2024-05-09 RX ORDER — MECLIZINE HCL 12.5 MG 12.5 MG/1
25 TABLET ORAL
Status: COMPLETED | OUTPATIENT
Start: 2024-05-09 | End: 2024-05-09

## 2024-05-09 RX ORDER — MECLIZINE HYDROCHLORIDE 25 MG/1
25 TABLET ORAL EVERY 8 HOURS PRN
Qty: 14 TABLET | Refills: 0 | Status: SHIPPED | OUTPATIENT
Start: 2024-05-09

## 2024-05-09 RX ORDER — LORAZEPAM 2 MG/ML
0.5 INJECTION INTRAMUSCULAR
Status: COMPLETED | OUTPATIENT
Start: 2024-05-09 | End: 2024-05-09

## 2024-05-09 RX ADMIN — LORAZEPAM 0.5 MG: 2 INJECTION INTRAMUSCULAR; INTRAVENOUS at 08:05

## 2024-05-09 RX ADMIN — MECLIZINE 25 MG: 12.5 TABLET ORAL at 07:05

## 2024-05-09 RX ADMIN — PROCHLORPERAZINE EDISYLATE 10 MG: 5 INJECTION INTRAMUSCULAR; INTRAVENOUS at 07:05

## 2024-05-09 RX ADMIN — SODIUM CHLORIDE 1000 ML: 9 INJECTION, SOLUTION INTRAVENOUS at 07:05

## 2024-05-09 NOTE — Clinical Note
"Nahomy De La Torre" Rio was seen and treated in our emergency department on 5/9/2024.  She may return to work on 05/10/2024.       If you have any questions or concerns, please don't hesitate to call.       RN    "

## 2024-05-09 NOTE — DISCHARGE INSTRUCTIONS
Please make sure you are drinking plenty of fluids.  Please call your primary care physician today for a follow-up appointment for further evaluation and management.  Please return with any new or worsening symptoms.

## 2024-05-09 NOTE — ED NOTES
Pt reports zofran 4 mg taken    Reports nausea is worse when getting up and resolved when lying.

## 2024-05-09 NOTE — ED PROVIDER NOTES
Encounter Date: 2024       History     Chief Complaint   Patient presents with    Nausea    Dizziness     Pt reports dizziness and nausea this am at approx 0300. Reports dose of zofran with some relief     53-year-old female presents to the emergency department complaining of dizziness.  States she woke up around 3 or 330 this morning feeling dizzy like the room was spinning.  States it almost entirely if not entirely resolves when she lays still.  However if she sits up or stands up she feels as if the room is spinning.  This is accompanied by nausea without emesis.  States she has felt hot and cold and clammy on and off.  No other symptoms reported at this time.      Review of patient's allergies indicates:  No Known Allergies  Past Medical History:   Diagnosis Date    Allergy     Depression     High cholesterol     Hypertension     Overactive bladder     Type 2 diabetes mellitus without complications      Past Surgical History:   Procedure Laterality Date     SECTION      HYSTERECTOMY      KNEE SURGERY      OOPHORECTOMY      SHOULDER SURGERY      SHOULDER SURGERY       Family History   Problem Relation Name Age of Onset    Diabetes Mother      Breast cancer Mother      Diabetes Father       Social History     Tobacco Use    Smoking status: Never    Smokeless tobacco: Never   Substance Use Topics    Alcohol use: Yes     Comment: socially    Drug use: No     Review of Systems   Constitutional:  Negative for chills and fatigue.   HENT:  Negative for congestion.    Respiratory:  Negative for cough and shortness of breath.    Cardiovascular:  Negative for chest pain.   Gastrointestinal:  Negative for abdominal pain.   Musculoskeletal:  Negative for back pain.   Neurological:  Negative for light-headedness and headaches.       Physical Exam     Initial Vitals [24 0630]   BP Pulse Resp Temp SpO2   136/83 105 18 97.9 °F (36.6 °C) 99 %      MAP       --         Physical Exam    Nursing note and vitals  reviewed.  Constitutional: She appears well-developed and well-nourished. No distress.   HENT:   Head: Normocephalic and atraumatic.   Eyes: Conjunctivae and EOM are normal. Pupils are equal, round, and reactive to light.   HINTS exam Positive, consistent with peripheral lesion, with nystagmus back to midline after forced eversion and returned to neutral   Neck: Neck supple. No tracheal deviation present.   Normal range of motion.  Cardiovascular:  Normal rate and intact distal pulses.           Pulmonary/Chest: No respiratory distress.   Musculoskeletal:         General: No tenderness or edema. Normal range of motion.      Cervical back: Normal range of motion and neck supple.     Neurological: She is alert and oriented to person, place, and time. She has normal strength. No cranial nerve deficit. GCS score is 15. GCS eye subscore is 4. GCS verbal subscore is 5. GCS motor subscore is 6.   Skin: Skin is warm and dry.         ED Course   Procedures  Labs Reviewed   CBC W/ AUTO DIFFERENTIAL - Abnormal; Notable for the following components:       Result Value    Mono # 0.2 (*)     All other components within normal limits   COMPREHENSIVE METABOLIC PANEL - Abnormal; Notable for the following components:    Glucose 128 (*)     All other components within normal limits   TROPONIN I   MAGNESIUM              X-Rays:   Independently Interpreted Readings:   Other Readings:  CT head independently interpreted by me pending radiology review:  No acute intracranial findings    Imaging Results              CT Head Without Contrast (Final result)  Result time 05/09/24 07:37:04      Final result by Gopal Mckeon MD (05/09/24 07:37:04)                   Impression:      1.  Negative for acute intracranial process. Negative for hemorrhage, or skull fracture.    2.  Right posterior ethmoid air cell disease.    All CT scans at this facility are performed  using dose modulation techniques as appropriate to performed exam including the  following:  automated exposure control; adjustment of mA and/or kV according to the patients size (this includes techniques or standardized protocols for targeted exams where dose is matched to indication/reason for exam: i.e. extremities or head);  iterative reconstruction technique.      Electronically signed by: Gopal Mckeon MD  Date:    05/09/2024  Time:    07:37               Narrative:    EXAMINATION:  CT HEAD WITHOUT CONTRAST    CLINICAL HISTORY:  Dizziness, non-specific; vertigo    TECHNIQUE:  Axial images through the brain and posterior fossa were obtained without the use of IV contrast.  Sagittal and coronal reconstructions are provided for review.    COMPARISON:  December 26, 2022    FINDINGS:  The ventricles are midline and the CSF spaces are normal. The gray-white matter junction is well preserved. Negative for intracranial vascular abnormalities. Negative for mass, mass effect, cerebral edema, hemorrhage or abnormal fluid collections.  Arachnoid granulation calcifications.    The skull and scalp are  intact.  Patchy right posterior ethmoid air cell disease.  The rest of the paranasal sinuses, mastoid air cells, middle ears and ear canals are clear. The globes are intact.                                      Medications   sodium chloride 0.9% bolus 1,000 mL 1,000 mL (1,000 mLs Intravenous New Bag 5/9/24 0710)   prochlorperazine injection Soln 10 mg (10 mg Intravenous Given 5/9/24 0710)   meclizine tablet 25 mg (25 mg Oral Given 5/9/24 0710)   LORazepam injection 0.5 mg (0.5 mg Intravenous Given 5/9/24 0803)     Medical Decision Making  53-year-old female presents to the emergency department complaining of dizziness accompanied by nausea      Differential:  Central versus peripheral vertigo, ICH, CVA, Meniere's disease      Patient with reassuring hints exam, labs, and CT here.  Given some IV fluid with Compazine and some meclizine with moderate improvement in symptoms.  Given 0.5 mg of Ativan with  resolution of symptoms.  Patient has never had noticeable ataxia or any vomiting.  She did feel uncomfortable with standing and felt dizzy but did not have any swaying.  At time of discharge she is able to stand without dizziness.  We discussed disposition including discharge with prescription for meclizine, instructions on home management, prompt follow-up with primary care physician, strict return precautions given.  Vital signs stable, patient comfortable with discharge at this time.    Problems Addressed:  Vertigo: acute illness or injury    Amount and/or Complexity of Data Reviewed  External Data Reviewed: ECG and notes.     Details: Reviewed most recent EKG for comparison    Reviewed most recent your gone note documenting baseline medications and past medical history  Labs: ordered.     Details: CBC without leukocytosis, normal H&H; CMP with normal renal and liver function tests, normal electrolytes; troponin negative  Radiology: ordered and independent interpretation performed. Decision-making details documented in ED Course.  ECG/medicine tests: ordered and independent interpretation performed. Decision-making details documented in ED Course.    Risk  OTC drugs.  Prescription drug management.  Parenteral controlled substances.                                      Clinical Impression:  Final diagnoses:  [R42] Vertigo          ED Disposition Condition    Discharge Stable          ED Prescriptions       Medication Sig Dispense Start Date End Date Auth. Provider    meclizine (ANTIVERT) 25 mg tablet Take 1 tablet (25 mg total) by mouth every 8 (eight) hours as needed for Dizziness. 14 tablet 5/9/2024 -- Mandeep Oleary MD          Follow-up Information       Follow up With Specialties Details Why Contact Info    Maicol Rizo MD Family Medicine Schedule an appointment as soon as possible for a visit   [address] [facility]  [place] [id number] [phone]               Mandeep Oleary,  MD  05/09/24 0847

## 2024-05-12 LAB
OHS QRS DURATION: 82 MS
OHS QTC CALCULATION: 448 MS

## 2024-06-07 ENCOUNTER — HOSPITAL ENCOUNTER (OUTPATIENT)
Dept: RADIOLOGY | Facility: OTHER | Age: 54
Discharge: HOME | End: 2024-06-07
Attending: INTERNAL MEDICINE
Payer: COMMERCIAL

## 2024-06-07 DIAGNOSIS — K59.00 CONSTIPATION: ICD-10-CM

## 2024-06-07 DIAGNOSIS — R16.0 HEPATOMEGALY: ICD-10-CM

## 2024-06-14 ENCOUNTER — HOSPITAL ENCOUNTER (OUTPATIENT)
Dept: RADIOLOGY | Facility: OTHER | Age: 54
Discharge: HOME | End: 2024-06-14
Attending: INTERNAL MEDICINE
Payer: COMMERCIAL

## 2024-06-14 PROCEDURE — 76981 USE PARENCHYMA: CPT | Mod: TC

## 2024-06-14 PROCEDURE — 76981 USE PARENCHYMA: CPT | Mod: 26,,, | Performed by: RADIOLOGY

## 2024-08-05 ENCOUNTER — PATIENT MESSAGE (OUTPATIENT)
Dept: UROGYNECOLOGY | Facility: CLINIC | Age: 54
End: 2024-08-05
Payer: COMMERCIAL

## 2024-08-05 ENCOUNTER — LAB VISIT (OUTPATIENT)
Dept: LAB | Facility: HOSPITAL | Age: 54
End: 2024-08-05
Attending: PHYSICIAN ASSISTANT
Payer: COMMERCIAL

## 2024-08-05 DIAGNOSIS — N39.0 RECURRENT UTI: ICD-10-CM

## 2024-08-05 DIAGNOSIS — R39.9 UTI SYMPTOMS: Primary | ICD-10-CM

## 2024-08-05 PROCEDURE — 87086 URINE CULTURE/COLONY COUNT: CPT | Mod: PN | Performed by: PHYSICIAN ASSISTANT

## 2024-08-05 PROCEDURE — 87186 SC STD MICRODIL/AGAR DIL: CPT | Mod: PN | Performed by: PHYSICIAN ASSISTANT

## 2024-08-05 PROCEDURE — 87088 URINE BACTERIA CULTURE: CPT | Mod: PN | Performed by: PHYSICIAN ASSISTANT

## 2024-08-06 RX ORDER — CEPHALEXIN 500 MG/1
500 CAPSULE ORAL EVERY 12 HOURS
Qty: 10 CAPSULE | Refills: 0 | Status: SHIPPED | OUTPATIENT
Start: 2024-08-06 | End: 2024-08-11

## 2024-08-07 LAB — BACTERIA UR CULT: ABNORMAL

## 2024-08-21 ENCOUNTER — LAB VISIT (OUTPATIENT)
Dept: LAB | Facility: HOSPITAL | Age: 54
End: 2024-08-21
Attending: PHYSICIAN ASSISTANT
Payer: COMMERCIAL

## 2024-08-21 DIAGNOSIS — R39.9 UTI SYMPTOMS: ICD-10-CM

## 2024-08-21 DIAGNOSIS — N39.0 RECURRENT UTI: ICD-10-CM

## 2024-08-21 PROCEDURE — 87086 URINE CULTURE/COLONY COUNT: CPT | Mod: PN | Performed by: PHYSICIAN ASSISTANT

## 2024-08-22 LAB
BACTERIA UR CULT: NORMAL
BACTERIA UR CULT: NORMAL

## 2024-09-06 DIAGNOSIS — N39.46 MIXED STRESS AND URGE URINARY INCONTINENCE: ICD-10-CM

## 2024-09-06 RX ORDER — VIBEGRON 75 MG/1
1 TABLET, FILM COATED ORAL
Qty: 90 TABLET | Refills: 1 | Status: SHIPPED | OUTPATIENT
Start: 2024-09-06

## 2024-12-16 NOTE — TELEPHONE ENCOUNTER
----- Message from Carlitos Mckeon sent at 7/25/2022  2:13 PM CDT -----  Regarding: speak with nurse  Type:  Patient Returning Call    Who Called:patient     Who Left Message for Patient:n/a    Does the patient know what this is regarding?:speak with nurse    Would the patient rather a call back or a response via [facility]? Call back     Best Call Back Number:[phone]  Additional Information: n/a        
Patient was calling requesting mammo orders to be faxed to [facility] I stated I will not be in [place] until Wednesday to be able to access the order form to fax it the patient stated she will call back Wednesday to remind me to fax.  
No

## 2025-03-12 ENCOUNTER — TELEPHONE (OUTPATIENT)
Dept: UROGYNECOLOGY | Facility: CLINIC | Age: 55
End: 2025-03-12
Payer: COMMERCIAL

## 2025-03-12 DIAGNOSIS — N39.46 MIXED STRESS AND URGE URINARY INCONTINENCE: ICD-10-CM

## 2025-03-12 RX ORDER — VIBEGRON 75 MG/1
1 TABLET, FILM COATED ORAL DAILY
Qty: 90 TABLET | Refills: 0 | Status: SHIPPED | OUTPATIENT
Start: 2025-03-12

## 2025-03-12 NOTE — TELEPHONE ENCOUNTER
----- Message from George sent at 3/12/2025  3:10 PM CDT -----  Who Called:RONALD TOMLINSON [[id number]] New Prescription or Refill : RefillRX Name and Strength:  vibegron (GEMTESA) 75 mg Tab Local or Mail Order : Local   Mail Order  [facility] the patient rather a call back or a response via [facility]? Padma Call Back Number:  [phone] Information:None

## 2025-03-25 ENCOUNTER — TELEPHONE (OUTPATIENT)
Dept: UROGYNECOLOGY | Facility: CLINIC | Age: 55
End: 2025-03-25
Payer: COMMERCIAL

## 2025-03-25 ENCOUNTER — PATIENT MESSAGE (OUTPATIENT)
Dept: UROGYNECOLOGY | Facility: CLINIC | Age: 55
End: 2025-03-25
Payer: COMMERCIAL

## 2025-03-25 NOTE — TELEPHONE ENCOUNTER
Returned [facility] phone call. Pt did not receive package possibly lost. [facility] will reship.         ----- Message from Nichelle sent at 3/25/2025 12:48 PM CDT -----  Type:  Needs Medical AdviceWho Called: [facility] Rep Pharmacy name and phone #:  [facility] AT Portal to [facility] Sites Phone: [phone]: [phone] the patient rather a call back or a response via [facility]? Call back Best Call Back Number: [phone] option 2 Additional Information: [facility] would like to speak with the providers to confirm to the prescription for vibegron (GEMTESA) 75 mg Tab. Patient states she low on medication. [facility] would like a call back with further assistance as soon as possible.

## 2025-04-30 ENCOUNTER — OFFICE VISIT (OUTPATIENT)
Dept: UROGYNECOLOGY | Facility: CLINIC | Age: 55
End: 2025-04-30
Payer: COMMERCIAL

## 2025-04-30 DIAGNOSIS — N39.0 RECURRENT UTI: ICD-10-CM

## 2025-04-30 DIAGNOSIS — N39.0 FREQUENT UTI: Primary | ICD-10-CM

## 2025-04-30 DIAGNOSIS — N39.46 MIXED STRESS AND URGE URINARY INCONTINENCE: ICD-10-CM

## 2025-04-30 DIAGNOSIS — N89.8 VAGINAL DISCHARGE: ICD-10-CM

## 2025-04-30 DIAGNOSIS — R39.9 UTI SYMPTOMS: ICD-10-CM

## 2025-04-30 PROCEDURE — 1160F RVW MEDS BY RX/DR IN RCRD: CPT | Mod: CPTII,S$GLB,, | Performed by: OBSTETRICS & GYNECOLOGY

## 2025-04-30 PROCEDURE — 1159F MED LIST DOCD IN RCRD: CPT | Mod: CPTII,S$GLB,, | Performed by: OBSTETRICS & GYNECOLOGY

## 2025-04-30 PROCEDURE — 99999 PR PBB SHADOW E&M-EST. PATIENT-LVL III: CPT | Mod: PBBFAC,,, | Performed by: OBSTETRICS & GYNECOLOGY

## 2025-04-30 PROCEDURE — G2211 COMPLEX E/M VISIT ADD ON: HCPCS | Mod: S$GLB,,, | Performed by: OBSTETRICS & GYNECOLOGY

## 2025-04-30 PROCEDURE — 99213 OFFICE O/P EST LOW 20 MIN: CPT | Mod: S$GLB,,, | Performed by: OBSTETRICS & GYNECOLOGY

## 2025-04-30 PROCEDURE — 87086 URINE CULTURE/COLONY COUNT: CPT | Performed by: OBSTETRICS & GYNECOLOGY

## 2025-04-30 RX ORDER — VIBEGRON 75 MG/1
1 TABLET, FILM COATED ORAL DAILY
Qty: 90 TABLET | Refills: 0 | Status: SHIPPED | OUTPATIENT
Start: 2025-04-30

## 2025-04-30 RX ORDER — NITROFURANTOIN 25; 75 MG/1; MG/1
100 CAPSULE ORAL 2 TIMES DAILY PRN
Qty: 30 CAPSULE | Refills: 11 | Status: SHIPPED | OUTPATIENT
Start: 2025-04-30

## 2025-04-30 RX ORDER — ESTRADIOL 0.1 MG/G
1 CREAM VAGINAL
Qty: 42.5 G | Refills: 3 | Status: SHIPPED | OUTPATIENT
Start: 2025-05-01

## 2025-04-30 NOTE — PATIENT INSTRUCTIONS
1)  Urinary urgency/frequency:    --Empty bladder every 2-3 hours even if you don't have the urge.  Empty well: wait a minute, lean forward on toilet.    --Try not to go more frequently than once an hour. When you get the urge to urinate after you have just gone, distract yourself until the urge passes.   --Avoid dietary irritants (see sheet).  Keep diary x 3-5 days to determine your irritants.  --KEGELS: do 10 in AM and 10 in PM, holding each x 10 seconds.  When you feel urge to go, STOP, KEGEL, and when urge has passed, then go to bathroom.    --Start pelvic floor PT.  Call to make appt:  [facility] (all take Medicaid):  [facility] ([place] or other): [address] [place] [address]. Patients can park in the [place] entrance and it is on the first floor. [phone]) [phone] or (p) [phone] (). (f) [phone]. (f) [phone].      --URGE LEAKAGE: Continue Gemtesa Takes 2-4 weeks to see if will have effect.  For dry mouth: use Biotene mouthwash, get sour, sugar free lozenge or gum.  May increase constipation.   --STRESS LEAKAGE (leak with laugh/cough/sneeze):  Pelvic floor PT vs Pessary vs. Mid-urethral sling surgery vs Impressa vs Periurethral bulking.   -- PVR (post void residual): 30 cc  -- Hgb A1C:  None recent on file     2)  Frequent UTI symptoms:  --culture pending from clean catch yesterday  --continue Uribel up to 4 times daily as needed  --If you feel like you have a UTI, please call our office so that we can place an order for you to drop off a urine specimen at the closest [facility] lab (we will arrange).                --We will call in antibiotics for you to start right after you drop off specimen.                --In this way, we can determine:                           1)  Do you have a UTI?  Or is it inflammation/dryness?                          2) If you have a UTI, is it sensitive to the antibiotics we prescribed?   --follow UTI prevention tips (see  "attached)  --control bowel movements/fecal cross-contamination  --treat vaginal atrophy (dryness) -- see below  --empty bladder before and after intercourse  --take antibiotic before or after intercourse  --continue taking 1 cranberry tablet daily ex Theracran  --continue taking 1 probiotic pill (any with lactobacillus and/or acidophilus) daily -- recommend Physician's Choice because has cranberry, D-mannose, and probiotic all in one  --continue daily D-mannose  --consider need for further evaluation (pelvic imaging and cystoscopy) if issue persists -- previous normal     3) Vaginal atrophy (dryness):    A)  Vaginal estrogen:  --Continue 0.5 grams of estrogen cream in vagina with applicator or dime-sized amount with finger (as far as can reach internally) nightly x 2 weeks, then twice a week thereafter.  You can also apply a dime-sized amount with your finger around the vaginal opening and inner lips at same frequency.                           --Vaginal estrogen may help to decrease pain related to dryness with intercourse and urinary symptoms (urgency/frequency/UTIs) around menopause.               [email or web address]://[email or web address]-Dose_Vaginal_Estrogen_Therapy.pdf  B) Non-estrogen options for vaginal dryness:  --Use REPLENS or REFRESH OTC: 1/2 applicator full in vagina twice a week.    --coconut oil: dime-sized amount with finger (as far as can reach internally) and around the vaginal opening and inner lips up to nightly as needed  --Uberlube, [place]roglide, [place] liquibeads, Satin by Sliquid Natural Intimate Moisturizer    4)  Constipation:  -- improved  -- Controlling constipation may help bladder urgency/leakage and fiber may better control cholesterol and blood glucose.   -- Increase daily fiber.  Take 4-6 gummies/day.    -- continue miralax as per GI -- watch for stool being too loose  -- May also use over the counter stool softener (ex Colace) 1-2 x/day.  **AVOID laxatives.  -- Continue "Natural Vitality " "Calm" powder or magnesium oxide 400-500 mg per night (helps with sleep, anxiety, and constipation)  -- Drink plenty of water!  -- Get a SQUATTY POTTY  -- ozempic     5) RTC 1 year.  "

## 2025-04-30 NOTE — PROGRESS NOTES
[facility] 4TH FLOOR  [address]  [facility] [address]  2025     Nahomy Locoid  [id number]  1970    UROGYN FOLLOW-UP  2025     54 y.o. female,   presents for urogyn follow up. Last visit with MOSES Yousif (2024).      GYN: Brock (sees annually)    HPI from 2023:  HPI: 52 y/o  presents for recurrent UTI. No positive urine cultures on file.      1)  UI:  (+) LELA = (+) UUI  X 6months.  (--) pads. Daytime frequency: Q 1 hours.  Nocturia: Yes: 1/night.   (--) dysuria,  (--) hematuria,  (+) frequent UTIs.  (+) incomplete bladder emptying.      2)  POP:  Absent.  (+) sexually active.  (--) dyspareunia.  (+)  Vaginal dryness.  (--) vaginal estrogen use. Has used biotee pellets in the past. Stopped using due to excessive hair growth. Has never tried vaginal estrogen.      3)  BM:  (+) constipation/straining, but improved with magnesium, and fiber.  (--) chronic diarrhea. (--) hematochezia.  (--) fecal incontinence.  (--) fecal smearing/urgency.  (--) incomplete evacuation. Reports intermittent constipation since February.  Has a normal bowel movement once or twice a week.      4) Frequent UTI symptoms: Reports suprapubic pain and fullness, denies dysuria. Has stopped using poise pads for 2 weeks because she believed that is making UTI symptoms reoccur.       2023:  Patient feels like she has a UTI presently. Is taking Azo.  She has been using vaginal estrogen 2 nights per week and daily Physician's choice probiotic with D-mannose and cranberry. Reports drinking plenty of water. Leakage has been the same, she got back on oxybutynin which she thinks helps and helps with UTI symptoms. Bowel movements are better.     2024:  Patient started having some burning of the urethra and more frequent sensation to urinate a few days ago. She has a culture pending. Uribel once daily helps. Her BS have been a little elevated because she is taking steroid now for shoulder pain.  Denies constipation.     TODAY:  1)  Urinary urgency/frequency:    --stable; no major issues on gemtesa  --does have occasional LELA--has to x legs  --didn't go to PT     2)  Frequent UTI symptoms:  --last UTI 2024 POS C&S; last symptoms 2024--saw PCP, no C&S, was treated  --has some mild burning around vaginal area today  --treating vaginal atrophy (dryness)   --empty bladder before and after intercourse--taking post coital ABX--works but annoyed by needing to do that  --continues daily D + cranberry + probiotics  --consider need for further evaluation (pelvic imaging and cystoscopy) if issue persists -- previous normal     3) Vaginal atrophy (dryness):    --continues 2x/week    4)  Constipation:  --sees GI  --taking miralax daily--helps   --stool can be looser and will back off PRN  --tried fiber in past--didn't work  --taking fiberwell gummies (2/day)  --taking ozempic (DM)    Past Medical History:   Diagnosis Date    Allergy     Depression     High cholesterol     Hypertension     Overactive bladder     Type 2 diabetes mellitus without complications        Past Surgical History:   Procedure Laterality Date     SECTION      HYSTERECTOMY      KNEE SURGERY      OOPHORECTOMY      SHOULDER SURGERY      SHOULDER SURGERY         Family History   Problem Relation Name Age of Onset    Diabetes Mother      Breast cancer Mother      Diabetes Father         Social History     Socioeconomic History    Marital status:    Tobacco Use    Smoking status: Never    Smokeless tobacco: Never   Substance and Sexual Activity    Alcohol use: Yes     Comment: socially    Drug use: No    Sexual activity: Yes     Partners: Male     Birth control/protection: See Surgical Hx     Social Drivers of Health     Financial Resource Strain: Low Risk  (2025)    Overall Financial Resource Strain (CARDIA)     Difficulty of Paying Living Expenses: Not hard at all   Food Insecurity: No Food Insecurity (2025)    Hunger Vital  Sign     Worried About Running Out of Food in the Last Year: Never true     Ran Out of Food in the Last Year: Never true   Transportation Needs: No Transportation Needs (4/29/2025)    PRAPARE - Transportation     Lack of Transportation (Medical): No     Lack of Transportation (Non-Medical): No   Physical Activity: Insufficiently Active (4/29/2025)    Exercise Vital Sign     Days of Exercise per Week: 2 days     Minutes of Exercise per Session: 50 min   Stress: Stress Concern Present (4/29/2025)    [employer] [place] [employer] - Occupational Stress Questionnaire     Feeling of Stress : To some extent   Housing Stability: Low Risk  (4/29/2025)    Housing Stability Vital Sign     Unable to Pay for Housing in the Last Year: No     Number of Times Moved in the Last Year: 0     Homeless in the Last Year: No       Current Outpatient Medications   Medication Sig Dispense Refill    alprazolam (XANAX) 1 MG tablet Take 1 mg by mouth.      citalopram (CELEXA) 20 MG tablet Take 20 mg by mouth once daily.       [START ON 5/1/2025] estradioL (ESTRACE) 0.01 % (0.1 mg/gram) vaginal cream Place 1 g vaginally twice a week. 42.5 g 3    famotidine (PEPCID) 20 MG tablet Take 20 mg by mouth 2 (two) times daily as needed.      fexofenadine (ALLEGRA) 180 MG tablet Take 180 mg by mouth once daily.      ibuprofen (ADVIL,MOTRIN) 800 MG tablet Take 800 mg by mouth 3 (three) times daily.      levothyroxine (SYNTHROID) 50 MCG tablet Take 50 mcg by mouth before breakfast.      losartan (COZAAR) 50 MG tablet Take 50 mg by mouth.      meclizine (ANTIVERT) 25 mg tablet Take 1 tablet (25 mg total) by mouth every 8 (eight) hours as needed for Dizziness. 14 tablet 0    metFORMIN (GLUCOPHAGE-XR) 500 MG ER 24hr tablet Take 500 mg by mouth 2 (two) times daily.      methen-m.blue-s.phos-phsal-hyo (URIBEL) 118-10-40.8-36 mg Cap Take 1 capsule by mouth 4 (four) times daily as needed (dysuria). 20 capsule 11    nitrofurantoin,  macrocrystal-monohydrate, (MACROBID) 100 MG capsule Take 1 capsule (100 mg total) by mouth 2 (two) times daily as needed (within 2 hours of intercourse). 30 capsule 11    ondansetron (ZOFRAN-ODT) 4 MG TbDL Take 4 mg by mouth every 8 (eight) hours as needed.      oxyCODONE-acetaminophen (PERCOCET) 5-325 mg per tablet Take by mouth.      pantoprazole (PROTONIX) 40 MG tablet Take 40 mg by mouth.      phenazopyridine (PYRIDIUM) 200 MG tablet Take 1 tablet (200 mg total) by mouth 3 (three) times daily as needed for Pain. 10 tablet 0    pravastatin (PRAVACHOL) 20 MG tablet Take 20 mg by mouth.      semaglutide (OZEMPIC) 1 mg/dose (2 mg/1.5 mL) PnIj       vibegron (GEMTESA) 75 mg Tab Take 1 tablet (75 mg total) by mouth Daily. 90 tablet 0     No current facility-administered medications for this visit.       Review of patient's allergies indicates:  No Known Allergies    OB History          2    Para   2    Term   2            AB        Living   2         SAB        IAB        Ectopic        Multiple        Live Births   2                  ROS  As per HPI.      Physical Exam  There were no vitals taken for this visit.  General: alert and oriented, no acute distress  Respiratory: normal respiratory effort  Remainder of PE deferred.     Impression  1. Frequent UTI  Urine Culture High Risk    Urine Culture High Risk    Urine Culture High Risk      2. UTI symptoms  estradioL (ESTRACE) 0.01 % (0.1 mg/gram) vaginal cream      3. Mixed stress and urge urinary incontinence  estradioL (ESTRACE) 0.01 % (0.1 mg/gram) vaginal cream    vibegron (GEMTESA) 75 mg Tab    Ambulatory Referral/Consult to Physical Therapy      4. Recurrent UTI  nitrofurantoin, macrocrystal-monohydrate, (MACROBID) 100 MG capsule      5. Vaginal discharge            We reviewed the above issues and discussed options for short-term versus long-term management of her problems.     Plan:   1)  Urinary urgency/frequency:    --Empty bladder every 2-3  hours even if you don't have the urge.  Empty well: wait a minute, lean forward on toilet.    --Try not to go more frequently than once an hour. When you get the urge to urinate after you have just gone, distract yourself until the urge passes.   --Avoid dietary irritants (see sheet).  Keep diary x 3-5 days to determine your irritants.  --KEGELS: do 10 in AM and 10 in PM, holding each x 10 seconds.  When you feel urge to go, STOP, KEGEL, and when urge has passed, then go to bathroom.    --Start pelvic floor PT.  Call to make appt:  [facility] (all take Medicaid):  [facility] ([place] or other): [address] [place] [address], [place] [address]. Patients can park in the [place] entrance and it is on the first floor. [phone]) [phone] or (p) [phone] (). (f) [phone]. (f) [phone].      --URGE LEAKAGE: Continue Gemtesa Takes 2-4 weeks to see if will have effect.  For dry mouth: use Biotene mouthwash, get sour, sugar free lozenge or gum.  May increase constipation.   --STRESS LEAKAGE (leak with laugh/cough/sneeze):  Pelvic floor PT vs Pessary vs. Mid-urethral sling surgery vs Impressa vs Periurethral bulking.   -- PVR (post void residual): 30 cc  -- Hgb A1C:  None recent on file     2)  Frequent UTI symptoms:  --culture pending from clean catch yesterday  --continue Uribel up to 4 times daily as needed  --If you feel like you have a UTI, please call our office so that we can place an order for you to drop off a urine specimen at the closest [facility] lab (we will arrange).                --We will call in antibiotics for you to start right after you drop off specimen.                --In this way, we can determine:                           1)  Do you have a UTI?  Or is it inflammation/dryness?                          2) If you have a UTI, is it sensitive to the antibiotics we prescribed?   --follow UTI prevention tips (see attached)  --control bowel movements/fecal  "cross-contamination  --treat vaginal atrophy (dryness) -- see below  --empty bladder before and after intercourse  --take antibiotic before or after intercourse  --continue taking 1 cranberry tablet daily ex Theracran  --continue taking 1 probiotic pill (any with lactobacillus and/or acidophilus) daily -- recommend Physician's Choice because has cranberry, D-mannose, and probiotic all in one  --continue daily D-mannose  --consider need for further evaluation (pelvic imaging and cystoscopy) if issue persists -- previous normal     3) Vaginal atrophy (dryness):    A)  Vaginal estrogen:  --Continue 0.5 grams of estrogen cream in vagina with applicator or dime-sized amount with finger (as far as can reach internally) nightly x 2 weeks, then twice a week thereafter.  You can also apply a dime-sized amount with your finger around the vaginal opening and inner lips at same frequency.                           --Vaginal estrogen may help to decrease pain related to dryness with intercourse and urinary symptoms (urgency/frequency/UTIs) around menopause.               [email or web address]-Dose_Vaginal_Estrogen_Therapy.pdf  B) Non-estrogen options for vaginal dryness:  --Use REPLENS or REFRESH OTC: 1/2 applicator full in vagina twice a week.    --coconut oil: dime-sized amount with finger (as far as can reach internally) and around the vaginal opening and inner lips up to nightly as needed  --Uberlube, [place], [place] liquibeads, Satin by Sliquid Natural Intimate Moisturizer    4)  Constipation:  -- improved  -- Controlling constipation may help bladder urgency/leakage and fiber may better control cholesterol and blood glucose.   -- Increase daily fiber.  Take 4-6 gummies/day.    -- continue miralax as per GI -- watch for stool being too loose  -- May also use over the counter stool softener (ex Colace) 1-2 x/day.  **AVOID laxatives.  -- Continue "Natural Vitality Calm" powder or magnesium oxide 400-500 mg " per night (helps with sleep, anxiety, and constipation)  -- Drink plenty of water!  -- Get a SQUATTY POTTY  -- ozempic     5) RTC 1 year.    20 minutes were spent in face to face time with this patient  100 % of this time was spent in counseling and/or coordination of care    Visit complexity today is associated with medical care services that are part of the ongoing care related to the single serious and/or complex condition of Recurrent urinary tract infections (Jose) and Interstitial cystitis (BPS/IC). A longitudinal relationship exists or is being developed between the patient and this practitioner for the care of this condition.     Rosa Mills MD  Division of Female Pelvic Medicine and Reconstructive Surgery  Department of Obstetrics & Gynecology  [facility] [place], [place]

## 2025-05-01 LAB — BACTERIA UR CULT: NORMAL

## 2025-05-07 ENCOUNTER — RESULTS FOLLOW-UP (OUTPATIENT)
Dept: UROGYNECOLOGY | Facility: CLINIC | Age: 55
End: 2025-05-07

## 2025-08-28 DIAGNOSIS — N39.46 MIXED STRESS AND URGE URINARY INCONTINENCE: ICD-10-CM

## 2025-08-29 RX ORDER — VIBEGRON 75 MG/1
1 TABLET, FILM COATED ORAL DAILY
Qty: 90 TABLET | Refills: 0 | Status: SHIPPED | OUTPATIENT
Start: 2025-08-29